# Patient Record
Sex: MALE | Race: WHITE
[De-identification: names, ages, dates, MRNs, and addresses within clinical notes are randomized per-mention and may not be internally consistent; named-entity substitution may affect disease eponyms.]

---

## 2019-01-31 ENCOUNTER — HOSPITAL ENCOUNTER (EMERGENCY)
Dept: HOSPITAL 88 - ER | Age: 44
Discharge: HOME | End: 2019-01-31
Payer: COMMERCIAL

## 2019-01-31 VITALS — DIASTOLIC BLOOD PRESSURE: 91 MMHG | SYSTOLIC BLOOD PRESSURE: 136 MMHG

## 2019-01-31 VITALS — BODY MASS INDEX: 23.87 KG/M2 | HEIGHT: 74 IN | WEIGHT: 186 LBS

## 2019-01-31 DIAGNOSIS — M51.16: ICD-10-CM

## 2019-01-31 DIAGNOSIS — M54.5: Primary | ICD-10-CM

## 2019-01-31 DIAGNOSIS — M54.16: ICD-10-CM

## 2019-01-31 DIAGNOSIS — M54.31: ICD-10-CM

## 2019-01-31 DIAGNOSIS — M51.34: ICD-10-CM
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
## 2019-01-31 DIAGNOSIS — M25.511: ICD-10-CM
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
## 2019-01-31 DIAGNOSIS — M51.14: ICD-10-CM

## 2019-01-31 DIAGNOSIS — G89.29: ICD-10-CM

## 2019-01-31 PROCEDURE — 99284 EMERGENCY DEPT VISIT MOD MDM: CPT

## 2019-01-31 PROCEDURE — 72131 CT LUMBAR SPINE W/O DYE: CPT

## 2019-01-31 PROCEDURE — 73030 X-RAY EXAM OF SHOULDER: CPT

## 2019-01-31 PROCEDURE — 72128 CT CHEST SPINE W/O DYE: CPT

## 2019-01-31 NOTE — DIAGNOSTIC IMAGING REPORT
RIGHT SHOULDER - 2 Images    



HISTORY:  Pain    

COMPARISON: None available.

     

FINDINGS:

Suboptimal external rotation.

Partially visualized right upper extremity approach catheter.



Bones:

Flattening of the posterior aspect of the humeral head with subtle cortical

irregularity and adjacent faint calcific densities.

No aggressive osseous lesion.



Joints:

Osseous alignment is within normal limits and the joint spaces are

well-maintained.



Soft tissues:

The soft tissues appear unremarkable.





IMPRESSION: 

Mild age-indeterminate impaction deformity of the posterior aspect of the

humeral head, correlate for history of blunt trauma or dislocation.



Signed by: Dr. Lukas Warren D.O., M.M.M. on 1/31/2019 12:35 PM

## 2019-01-31 NOTE — DIAGNOSTIC IMAGING REPORT
History: Back pain, continue to get worse

Comparison studies: CT chest 9/5/2018



Technique:: 

Axial were obtained through the  thoracic and lumbar regions.

Coronal and sagittal images reconstructed from the axial data.

Intravenous contrast: None



Dose modulation, iterative reconstruction, and/or weight based adjustment of

the mA/kV was utilized to reduce the radiation dose to as low as reasonably

achievable.



Findings:



Fractures: No acute fracture. Mild chronic wedge deformity from T10 through T12

vertebral bodies, without retropulsion, cortical step-off or soft tissue

changes, stable.

Soft tissues:No paraspinal gross abnormalities . Mild atherosclerotic

calcifications of the abdominal aorta and branches.



Atlantoaxial articulation: Intact.

Alignment: Normal lumbar lordosis. Mildly increased thoracic kyphosis centered

at T10-11. No scoliosis.

Cervicomedullary junction: No abnormalities. The foramen magnum is patent.



Soft tissues: No abnormalities..

Paraspinal muscles: Unremarkable 

Spinal cord: Can not be evaluated.



Vertebrae: 

No infection or neoplasm.



Degenerative changes:



Thoracic spine :

Disc degeneration with decreased intervertebral space from T5 through T12 with

associated endplate Schmorl nodes from T6 through T12 superior endplate.

At T10-11, right central disc osteophyte complex results in mild canal stenosis

without significant foraminal narrowing.

Mild bilateral foraminal narrowing from T6 through T11.

The remaining canal is grossly patent.

Anteriorly projecting osteophytes from T7 through T12 right central anterior

margin 



Lumbar spine :

At L2-L3, diffuse disc bulge and mild facet hypertrophy results in no

significant canal stenosis and mild bilateral foraminal narrowing.

At L3-L4, mild diffuse disc bulge and mild facet hypertrophy results in mild

canal stenosis and mild bilateral foraminal narrowing.

At L4-L5, disc degeneration with decreased intervertebral space, diffuse disc

bulge and mild facet hypertrophy results in mild canal stenosis, narrowing of

the bilateral subarticular recesses, mild right and moderate left foraminal

narrowing.

At L5-S1, disc degeneration with decreased intervertebral space and endplate

sclerotic changes. Diffuse disc bulge and mild facet hypertrophy results in

moderate bilateral foraminal narrowing.



IMPRESSION:



1.  No acute thoracic or lumbar abnormalities.

2.  Mild disc degeneration at the thoracic spine from T5 through T12 with

associated Schmorl nodes from T6 through T12, stable.

3.  Mild canal stenosis at T10-11 secondary to right central disc osteophyte

complex mild bilateral foraminal narrowing from T6 through T11. The remaining

canal and foramina are grossly patent, stable.

4.  Mild disc degeneration from L2 through L5 more significant at L5-S1 and

sclerotic changes. 

5.  Moderate degenerative foraminal narrowing at L4-L5 on the left and L5-S1

bilaterally. Mild degenerative canal stenosis at L3-L4 and L4-L5.







Signed by: DR Norris Reynaga M.D. on 1/31/2019 1:21 PM

## 2019-01-31 NOTE — XMS REPORT
Clinical Summary

                             Created on: 2019



Jake Herrmann, Gallo ASH

External Reference #: [id number]

: 1975

Sex: Male



Demographics







                          Address                   [address]

[place]  [address]

 

                          Home Phone                [phone]

 

                          Preferred Language        English

 

                          Marital Status            Unknown

 

                          Christianity Affiliation     None

 

                          Race                      White

 

                          Ethnic Group              Non-





Author







                          Author                    ANUPAMA [facility]

 

                          Address                   Unknown

 

                          Phone                     Unavailable







Support







                Name            Relationship    Address         Phone

 

                    Mimi Dickens        EC[employer]                [address] [place]  [address]                     [phone]

 

                    Ynes Joseph        EC[employer]                [address]

[place]  [address]                [phone]







Care Team Providers







                    Care Team Member Name    Role                Phone

 

                    Ramy Minor MD    PCP                 [phone]







Allergies







                                        Comments



                 Active Allergy     Reactions       Severity        Noted Date 

 

                                        



Constipation.



                     Codeine             Other (See          2016 



                                         Comments)   

 

                                        



States can take Keflex without problem. 



                 Penicillins     Swelling,       Low             2016 



                                         Rash   







Medications







                          End Date                  Status



              Medication     Sig          Dispensed     Refills      Start  



                                         Date  

 

                                                    Active



                     esomeprazole (NEXIUM) 20     Take 20 mg by       0   



                           MG capsule                mouth daily.     

 

                                                    Active



                     diphenhydrAMINE     Take 100 mg         0   



                           (BENADRYL) 50 MG tablet     by mouth     



                                         every night     



                                         as needed for     



                                         Itching.     

 

                                                    Active



                     HYDROcodone-acetaminophen     Take 1 tablet       0   



                           (NORCO 5-325) 5-325 mg     by mouth     



                           per tablet                every 6 (six)     



                                         hours as     



                                         needed for     



                                         Pain.     







Active Problems







 



                           Problem                   Noted Date

 

 



                           Extensor tendon laceration, finger, open wound     2016







Social History







                                        Date



                 Tobacco Use     Types           Packs/Day       Years Used 

 

                                         



                           Current Every Day Smoker       30 

 

    



                                         Smokeless Tobacco: Never   



                                         Used   









                                        Tobacco Cessation: Ready to Quit: Yes; Counseling Given: Yes

Comments: down to 1/3 ppd from 2 ppd.  In process of quitting. 









   



                 Alcohol Use     Drinks/Week     oz/Week         Comments

 

   



                                         No   









 



                           Sex Assigned at Birth     Date Recorded

 

 



                                         Not on file 









                                        Industry



                           Job Start Date            Occupation 

 

                                        Not on file



                           Not on file               Not on file 









                                        Travel End



                           Travel History            Travel Start 

 





                                         No recent travel history available.







Last Filed Vital Signs

Not on file



Plan of Treatment





Not on file



Results

Not on fileafter 2018



Insurance







     



            Payer      Benefit     Subscriber ID     Type       Phone      Address



                                         Plan /    



                                         Group    

 

     



                     [facility]              xxxxxxxxx   



                           MEDICARE MGD CARE         MEDICARE    



                                         HMO    









     



            Guarantor Name     Account     Relation to     Date of     Phone      Billing Address



                     Type                Patient             Birth  

 

     



            Gallo Joseph MIHIR Lew.     Personal/F     Self       1975     [phone] [address]



                     amily               (Home)              [place] [address]







Advance Directives





For more information, please contact:



[address] [address] [phone]









                          Date Inactivated          Comments



                           Code Status               Date Activated  

 

                          2016  5:11 PM         



                           Full Code                 2016 10:13 AM  









  



                           This code status was determined by:     Patient

## 2019-10-06 ENCOUNTER — HOSPITAL ENCOUNTER (OUTPATIENT)
Dept: HOSPITAL 88 - ER | Age: 44
Setting detail: OBSERVATION
LOS: 1 days | Discharge: HOME | End: 2019-10-07
Attending: INTERNAL MEDICINE | Admitting: INTERNAL MEDICINE
Payer: MEDICARE

## 2019-10-06 VITALS — BODY MASS INDEX: 23.87 KG/M2 | WEIGHT: 186 LBS | HEIGHT: 74 IN

## 2019-10-06 VITALS — DIASTOLIC BLOOD PRESSURE: 59 MMHG | SYSTOLIC BLOOD PRESSURE: 101 MMHG

## 2019-10-06 DIAGNOSIS — T87.44: Primary | ICD-10-CM

## 2019-10-06 DIAGNOSIS — I73.9: ICD-10-CM

## 2019-10-06 DIAGNOSIS — G89.4: ICD-10-CM

## 2019-10-06 DIAGNOSIS — K21.9: ICD-10-CM

## 2019-10-06 DIAGNOSIS — Z87.820: ICD-10-CM

## 2019-10-06 DIAGNOSIS — E78.5: ICD-10-CM

## 2019-10-06 DIAGNOSIS — L03.116: ICD-10-CM

## 2019-10-06 DIAGNOSIS — F51.04: ICD-10-CM

## 2019-10-06 DIAGNOSIS — F17.210: ICD-10-CM

## 2019-10-06 DIAGNOSIS — T87.89: ICD-10-CM

## 2019-10-06 DIAGNOSIS — I82.502: ICD-10-CM

## 2019-10-06 LAB
ALBUMIN SERPL-MCNC: 3.4 G/DL (ref 3.5–5)
ALBUMIN/GLOB SERPL: 0.9 {RATIO} (ref 0.8–2)
ALP SERPL-CCNC: 77 IU/L (ref 40–150)
ALT SERPL-CCNC: 14 IU/L (ref 0–55)
ANION GAP SERPL CALC-SCNC: 14.5 MMOL/L (ref 8–16)
BASOPHILS # BLD AUTO: 0.1 10*3/UL (ref 0–0.1)
BASOPHILS NFR BLD AUTO: 0.6 % (ref 0–1)
BUN SERPL-MCNC: 12 MG/DL (ref 7–26)
BUN/CREAT SERPL: 15 (ref 6–25)
CALCIUM SERPL-MCNC: 9.8 MG/DL (ref 8.4–10.2)
CHLORIDE SERPL-SCNC: 102 MMOL/L (ref 98–107)
CO2 SERPL-SCNC: 24 MMOL/L (ref 22–29)
DEPRECATED INR PLAS: 1.12
DEPRECATED NEUTROPHILS # BLD AUTO: 5.8 10*3/UL (ref 2.1–6.9)
EGFRCR SERPLBLD CKD-EPI 2021: > 60 ML/MIN (ref 60–?)
EOSINOPHIL # BLD AUTO: 0.1 10*3/UL (ref 0–0.4)
EOSINOPHIL NFR BLD AUTO: 1.2 % (ref 0–6)
ERYTHROCYTE [DISTWIDTH] IN CORD BLOOD: 15.1 % (ref 11.7–14.4)
GLOBULIN PLAS-MCNC: 3.9 G/DL (ref 2.3–3.5)
GLUCOSE SERPLBLD-MCNC: 102 MG/DL (ref 74–118)
HCT VFR BLD AUTO: 44.5 % (ref 38.2–49.6)
HGB BLD-MCNC: 15.2 G/DL (ref 14–18)
LYMPHOCYTES # BLD: 2 10*3/UL (ref 1–3.2)
LYMPHOCYTES NFR BLD AUTO: 22.5 % (ref 18–39.1)
MCH RBC QN AUTO: 31.4 PG (ref 28–32)
MCHC RBC AUTO-ENTMCNC: 34.2 G/DL (ref 31–35)
MCV RBC AUTO: 91.9 FL (ref 81–99)
MONOCYTES # BLD AUTO: 1 10*3/UL (ref 0.2–0.8)
MONOCYTES NFR BLD AUTO: 10.8 % (ref 4.4–11.3)
NEUTS SEG NFR BLD AUTO: 64.6 % (ref 38.7–80)
PLATELET # BLD AUTO: 313 X10E3/UL (ref 140–360)
POTASSIUM SERPL-SCNC: 3.5 MMOL/L (ref 3.5–5.1)
PROTHROMBIN TIME: 14.9 SECONDS (ref 11.9–14.5)
RBC # BLD AUTO: 4.84 X10E6/UL (ref 4.3–5.7)
SODIUM SERPL-SCNC: 137 MMOL/L (ref 136–145)

## 2019-10-06 PROCEDURE — 73562 X-RAY EXAM OF KNEE 3: CPT

## 2019-10-06 PROCEDURE — 87040 BLOOD CULTURE FOR BACTERIA: CPT

## 2019-10-06 PROCEDURE — 83605 ASSAY OF LACTIC ACID: CPT

## 2019-10-06 PROCEDURE — 80053 COMPREHEN METABOLIC PANEL: CPT

## 2019-10-06 PROCEDURE — 73590 X-RAY EXAM OF LOWER LEG: CPT

## 2019-10-06 PROCEDURE — 99284 EMERGENCY DEPT VISIT MOD MDM: CPT

## 2019-10-06 PROCEDURE — 87205 SMEAR GRAM STAIN: CPT

## 2019-10-06 PROCEDURE — 87071 CULTURE AEROBIC QUANT OTHER: CPT

## 2019-10-06 PROCEDURE — 36415 COLL VENOUS BLD VENIPUNCTURE: CPT

## 2019-10-06 PROCEDURE — 85610 PROTHROMBIN TIME: CPT

## 2019-10-06 PROCEDURE — 85025 COMPLETE CBC W/AUTO DIFF WBC: CPT

## 2019-10-06 RX ADMIN — Medication PRN MG: at 21:25

## 2019-10-06 RX ADMIN — SODIUM CHLORIDE SCH MLS/HR: 9 INJECTION, SOLUTION INTRAVENOUS at 20:04

## 2019-10-06 RX ADMIN — CEFEPIME SCH MLS/HR: 1 INJECTION, SOLUTION INTRAVENOUS at 17:10

## 2019-10-06 NOTE — NUR
patient arrived to unit via stretcher at this time. alert and oriented and in no distress. 
call bell within reach, bed in lowest locked position.

## 2019-10-06 NOTE — NUR
SPOKE TO DR. SCHMITZ AT THIS TIME REGARDING HOME MEDS. NEW ORDERS RECEIVED. SEE EMR FOR LIST 
OF HOME MEDS CONTINUED. MD ALSO ORDERED PT INR. WILL CALL FOR RESULTS

## 2019-10-06 NOTE — DIAGNOSTIC IMAGING REPORT
LEFT KNEE/LOWER LEG -3 images of the knee and 2 images of the residual lower

leg



HISTORY:  Stomach pain, DKA, stump cellulitis, possible infection    

COMPARISON: None available.

     

FINDINGS:

Bones:

Status post below-the-knee amputation at the level of the proximal tibial and

fibular diaphyses.



Joints:

Osseous alignment is within normal limits and the joint spaces are

well-maintained.



Soft tissues:

Soft tissue irregularity at the amputation site with regional soft tissue

swelling.

Metallic stent projects posterior to the distal femoral metaphysis.





IMPRESSION: 

1.  Status post below the knee amputation, findings compatible with the

provided history of cellulitis. 

2.  No specific radiographic evidence of osteomyelitis.



Signed by: Dr. Lukas Warren D.O., M.M.M. on 10/6/2019 5:12 PM

## 2019-10-07 VITALS — SYSTOLIC BLOOD PRESSURE: 100 MMHG | DIASTOLIC BLOOD PRESSURE: 56 MMHG

## 2019-10-07 VITALS — DIASTOLIC BLOOD PRESSURE: 78 MMHG | SYSTOLIC BLOOD PRESSURE: 142 MMHG

## 2019-10-07 VITALS — SYSTOLIC BLOOD PRESSURE: 115 MMHG | DIASTOLIC BLOOD PRESSURE: 70 MMHG

## 2019-10-07 VITALS — DIASTOLIC BLOOD PRESSURE: 81 MMHG | SYSTOLIC BLOOD PRESSURE: 143 MMHG

## 2019-10-07 LAB
ALBUMIN SERPL-MCNC: 2.8 G/DL (ref 3.5–5)
ALBUMIN/GLOB SERPL: 0.8 {RATIO} (ref 0.8–2)
ALP SERPL-CCNC: 65 IU/L (ref 40–150)
ALT SERPL-CCNC: 14 IU/L (ref 0–55)
ANION GAP SERPL CALC-SCNC: 11.4 MMOL/L (ref 8–16)
BASOPHILS # BLD AUTO: 0.1 10*3/UL (ref 0–0.1)
BASOPHILS NFR BLD AUTO: 0.9 % (ref 0–1)
BUN SERPL-MCNC: 14 MG/DL (ref 7–26)
BUN/CREAT SERPL: 16 (ref 6–25)
CALCIUM SERPL-MCNC: 9.6 MG/DL (ref 8.4–10.2)
CHLORIDE SERPL-SCNC: 104 MMOL/L (ref 98–107)
CO2 SERPL-SCNC: 28 MMOL/L (ref 22–29)
DEPRECATED NEUTROPHILS # BLD AUTO: 4.1 10*3/UL (ref 2.1–6.9)
EGFRCR SERPLBLD CKD-EPI 2021: > 60 ML/MIN (ref 60–?)
EOSINOPHIL # BLD AUTO: 0.2 10*3/UL (ref 0–0.4)
EOSINOPHIL NFR BLD AUTO: 2.9 % (ref 0–6)
ERYTHROCYTE [DISTWIDTH] IN CORD BLOOD: 15.1 % (ref 11.7–14.4)
GLOBULIN PLAS-MCNC: 3.5 G/DL (ref 2.3–3.5)
GLUCOSE SERPLBLD-MCNC: 89 MG/DL (ref 74–118)
HCT VFR BLD AUTO: 41.5 % (ref 38.2–49.6)
HGB BLD-MCNC: 13.8 G/DL (ref 14–18)
LYMPHOCYTES # BLD: 2.6 10*3/UL (ref 1–3.2)
LYMPHOCYTES NFR BLD AUTO: 32.8 % (ref 18–39.1)
MCH RBC QN AUTO: 31.5 PG (ref 28–32)
MCHC RBC AUTO-ENTMCNC: 33.3 G/DL (ref 31–35)
MCV RBC AUTO: 94.7 FL (ref 81–99)
MONOCYTES # BLD AUTO: 0.9 10*3/UL (ref 0.2–0.8)
MONOCYTES NFR BLD AUTO: 11.1 % (ref 4.4–11.3)
NEUTS SEG NFR BLD AUTO: 51.9 % (ref 38.7–80)
PLATELET # BLD AUTO: 271 X10E3/UL (ref 140–360)
POTASSIUM SERPL-SCNC: 4.4 MMOL/L (ref 3.5–5.1)
RBC # BLD AUTO: 4.38 X10E6/UL (ref 4.3–5.7)
SODIUM SERPL-SCNC: 139 MMOL/L (ref 136–145)

## 2019-10-07 RX ADMIN — SODIUM CHLORIDE PRN MG: 900 INJECTION INTRAVENOUS at 05:35

## 2019-10-07 RX ADMIN — Medication PRN MG: at 09:20

## 2019-10-07 RX ADMIN — Medication PRN MG: at 01:33

## 2019-10-07 RX ADMIN — Medication PRN MG: at 05:35

## 2019-10-07 RX ADMIN — SODIUM CHLORIDE SCH MLS/HR: 9 INJECTION, SOLUTION INTRAVENOUS at 06:50

## 2019-10-07 RX ADMIN — CEFEPIME SCH MLS/HR: 1 INJECTION, SOLUTION INTRAVENOUS at 05:01

## 2019-10-07 RX ADMIN — SODIUM CHLORIDE PRN MG: 900 INJECTION INTRAVENOUS at 09:20
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
## 2019-10-07 NOTE — NUR
bedside rounds complete no distress noted, updated on poc voiced understanding, denies pain 
at this time, redness noted to l bka warm to touch, ivf infusing to l ac 20g no ss of 
infiltration noted, no other co voiced call light in reach will continue to monitor

## 2019-10-08 ENCOUNTER — HOSPITAL ENCOUNTER (INPATIENT)
Dept: HOSPITAL 88 - ER | Age: 44
LOS: 14 days | Discharge: HOME | DRG: 493 | End: 2019-10-22
Attending: INTERNAL MEDICINE | Admitting: INTERNAL MEDICINE
Payer: MEDICARE

## 2019-10-08 VITALS — WEIGHT: 200.56 LBS | BODY MASS INDEX: 24.94 KG/M2 | HEIGHT: 75 IN

## 2019-10-08 DIAGNOSIS — Z79.01: ICD-10-CM

## 2019-10-08 DIAGNOSIS — F43.10: ICD-10-CM

## 2019-10-08 DIAGNOSIS — Z86.718: ICD-10-CM

## 2019-10-08 DIAGNOSIS — G89.29: ICD-10-CM

## 2019-10-08 DIAGNOSIS — Z86.711: ICD-10-CM

## 2019-10-08 DIAGNOSIS — E78.5: ICD-10-CM

## 2019-10-08 DIAGNOSIS — T87.44: Primary | ICD-10-CM

## 2019-10-08 DIAGNOSIS — F17.200: ICD-10-CM

## 2019-10-08 DIAGNOSIS — G40.909: ICD-10-CM

## 2019-10-08 DIAGNOSIS — L02.416: ICD-10-CM

## 2019-10-08 LAB
ANION GAP SERPL CALC-SCNC: 12 MMOL/L (ref 8–16)
BASOPHILS # BLD AUTO: 0 10*3/UL (ref 0–0.1)
BASOPHILS NFR BLD AUTO: 0.3 % (ref 0–1)
BUN SERPL-MCNC: 13 MG/DL (ref 7–26)
BUN/CREAT SERPL: 15 (ref 6–25)
CALCIUM SERPL-MCNC: 9.2 MG/DL (ref 8.4–10.2)
CHLORIDE SERPL-SCNC: 103 MMOL/L (ref 98–107)
CO2 SERPL-SCNC: 25 MMOL/L (ref 22–29)
DEPRECATED NEUTROPHILS # BLD AUTO: 6.7 10*3/UL (ref 2.1–6.9)
EGFRCR SERPLBLD CKD-EPI 2021: > 60 ML/MIN (ref 60–?)
EOSINOPHIL # BLD AUTO: 0.2 10*3/UL (ref 0–0.4)
EOSINOPHIL NFR BLD AUTO: 2 % (ref 0–6)
ERYTHROCYTE [DISTWIDTH] IN CORD BLOOD: 15 % (ref 11.7–14.4)
GLUCOSE SERPLBLD-MCNC: 83 MG/DL (ref 74–118)
HCT VFR BLD AUTO: 39.4 % (ref 38.2–49.6)
HGB BLD-MCNC: 13.8 G/DL (ref 14–18)
LYMPHOCYTES # BLD: 2.3 10*3/UL (ref 1–3.2)
LYMPHOCYTES NFR BLD AUTO: 22.8 % (ref 18–39.1)
MCH RBC QN AUTO: 32.4 PG (ref 28–32)
MCHC RBC AUTO-ENTMCNC: 35 G/DL (ref 31–35)
MCV RBC AUTO: 92.5 FL (ref 81–99)
MONOCYTES # BLD AUTO: 0.7 10*3/UL (ref 0.2–0.8)
MONOCYTES NFR BLD AUTO: 7 % (ref 4.4–11.3)
NEUTS SEG NFR BLD AUTO: 67.5 % (ref 38.7–80)
PLATELET # BLD AUTO: 324 X10E3/UL (ref 140–360)
POTASSIUM SERPL-SCNC: 4 MMOL/L (ref 3.5–5.1)
RBC # BLD AUTO: 4.26 X10E6/UL (ref 4.3–5.7)
SODIUM SERPL-SCNC: 136 MMOL/L (ref 136–145)

## 2019-10-08 PROCEDURE — 87075 CULTR BACTERIA EXCEPT BLOOD: CPT

## 2019-10-08 PROCEDURE — 87205 SMEAR GRAM STAIN: CPT

## 2019-10-08 PROCEDURE — 73701 CT LOWER EXTREMITY W/DYE: CPT

## 2019-10-08 PROCEDURE — 88304 TISSUE EXAM BY PATHOLOGIST: CPT

## 2019-10-08 PROCEDURE — 86140 C-REACTIVE PROTEIN: CPT

## 2019-10-08 PROCEDURE — 36415 COLL VENOUS BLD VENIPUNCTURE: CPT

## 2019-10-08 PROCEDURE — 85610 PROTHROMBIN TIME: CPT

## 2019-10-08 PROCEDURE — 85025 COMPLETE CBC W/AUTO DIFF WBC: CPT

## 2019-10-08 PROCEDURE — 84443 ASSAY THYROID STIM HORMONE: CPT

## 2019-10-08 PROCEDURE — 87186 SC STD MICRODIL/AGAR DIL: CPT

## 2019-10-08 PROCEDURE — 80053 COMPREHEN METABOLIC PANEL: CPT

## 2019-10-08 PROCEDURE — 88311 DECALCIFY TISSUE: CPT

## 2019-10-08 PROCEDURE — 88305 TISSUE EXAM BY PATHOLOGIST: CPT

## 2019-10-08 PROCEDURE — 99284 EMERGENCY DEPT VISIT MOD MDM: CPT

## 2019-10-08 PROCEDURE — 80202 ASSAY OF VANCOMYCIN: CPT

## 2019-10-08 PROCEDURE — 87071 CULTURE AEROBIC QUANT OTHER: CPT

## 2019-10-08 PROCEDURE — 80048 BASIC METABOLIC PNL TOTAL CA: CPT

## 2019-10-09 VITALS — SYSTOLIC BLOOD PRESSURE: 124 MMHG | DIASTOLIC BLOOD PRESSURE: 83 MMHG

## 2019-10-09 VITALS — DIASTOLIC BLOOD PRESSURE: 59 MMHG | SYSTOLIC BLOOD PRESSURE: 100 MMHG

## 2019-10-09 VITALS — DIASTOLIC BLOOD PRESSURE: 82 MMHG | SYSTOLIC BLOOD PRESSURE: 141 MMHG

## 2019-10-09 VITALS — SYSTOLIC BLOOD PRESSURE: 141 MMHG | DIASTOLIC BLOOD PRESSURE: 82 MMHG

## 2019-10-09 VITALS — DIASTOLIC BLOOD PRESSURE: 75 MMHG | SYSTOLIC BLOOD PRESSURE: 104 MMHG

## 2019-10-09 VITALS — DIASTOLIC BLOOD PRESSURE: 81 MMHG | SYSTOLIC BLOOD PRESSURE: 119 MMHG

## 2019-10-09 VITALS — SYSTOLIC BLOOD PRESSURE: 127 MMHG | DIASTOLIC BLOOD PRESSURE: 72 MMHG

## 2019-10-09 VITALS — SYSTOLIC BLOOD PRESSURE: 110 MMHG | DIASTOLIC BLOOD PRESSURE: 73 MMHG

## 2019-10-09 RX ADMIN — CEFEPIME HYDROCHLORIDE SCH MLS/HR: 1 INJECTION, POWDER, FOR SOLUTION INTRAMUSCULAR; INTRAVENOUS at 01:48

## 2019-10-09 RX ADMIN — SODIUM CHLORIDE SCH MLS/HR: 9 INJECTION, SOLUTION INTRAVENOUS at 14:24

## 2019-10-09 RX ADMIN — PREGABALIN SCH MG: 75 CAPSULE ORAL at 17:33

## 2019-10-09 RX ADMIN — ENOXAPARIN SODIUM SCH MG: 60 INJECTION SUBCUTANEOUS at 20:23

## 2019-10-09 RX ADMIN — QUETIAPINE SCH MG: 25 TABLET, FILM COATED ORAL at 20:23

## 2019-10-09 RX ADMIN — SODIUM CHLORIDE SCH MLS/HR: 9 INJECTION, SOLUTION INTRAVENOUS at 23:10

## 2019-10-09 RX ADMIN — SODIUM CHLORIDE PRN MG: 900 INJECTION INTRAVENOUS at 02:27

## 2019-10-09 RX ADMIN — CEFEPIME HYDROCHLORIDE SCH MLS/HR: 1 INJECTION, POWDER, FOR SOLUTION INTRAMUSCULAR; INTRAVENOUS at 23:10

## 2019-10-09 RX ADMIN — SODIUM CHLORIDE SCH MLS/HR: 9 INJECTION, SOLUTION INTRAVENOUS at 16:27

## 2019-10-09 RX ADMIN — SODIUM CHLORIDE SCH MLS/HR: 9 INJECTION, SOLUTION INTRAVENOUS at 01:48

## 2019-10-09 RX ADMIN — SODIUM CHLORIDE PRN MG: 900 INJECTION INTRAVENOUS at 07:46

## 2019-10-09 RX ADMIN — DULOXETINE HYDROCHLORIDE SCH MG: 30 CAPSULE, DELAYED RELEASE ORAL at 17:33

## 2019-10-09 RX ADMIN — Medication PRN MG: at 07:46

## 2019-10-09 RX ADMIN — ATORVASTATIN CALCIUM SCH MG: 10 TABLET, FILM COATED ORAL at 20:23

## 2019-10-09 RX ADMIN — ZOLPIDEM TARTRATE PRN MG: 10 TABLET, FILM COATED ORAL at 20:23

## 2019-10-09 RX ADMIN — CEFEPIME HYDROCHLORIDE SCH MLS/HR: 1 INJECTION, POWDER, FOR SOLUTION INTRAMUSCULAR; INTRAVENOUS at 14:24

## 2019-10-09 RX ADMIN — SODIUM CHLORIDE PRN MG: 900 INJECTION INTRAVENOUS at 11:40

## 2019-10-09 RX ADMIN — SODIUM CHLORIDE PRN MG: 900 INJECTION INTRAVENOUS at 20:23

## 2019-10-09 RX ADMIN — Medication PRN MG: at 02:27

## 2019-10-09 RX ADMIN — Medication PRN MG: at 20:23

## 2019-10-09 RX ADMIN — Medication PRN MG: at 11:40

## 2019-10-09 NOTE — CONSULTATION
DATE OF CONSULTATION:  10/09/2019  

 

CHIEF COMPLAINT:  Left BKA stump infection.

 

HISTORY OF PRESENT ILLNESS:  The patient is a 44-year-old male with history of left

below-knee amputations using prosthesis.  He complained of swelling, redness, and

tenderness in the left BKA stump for 5 days with subjective fevers.  The patient state

that he has been having difficulty with the prosthesis fitting well. 

 

PAST MEDICAL HISTORY:  Positive for peripheral vascular disease, Buerger syndrome,

traumatic brain injury in 2004, hypertension, hyperlipidemia, history of DVT, peripheral

neuropathy, GERD, and posttraumatic stress syndrome. 

 

PAST SURGICAL HISTORY:  Positive for left below-knee amputation, craniotomy, right

facial maxillary reconstruction, right ACL knee surgery, gastric surgery for ulcer, and

lumbar laminectomy. 

 

ALLERGIES:  HE IS ALLERGIC TO CODEINE AND PENICILLIN.

 

SOCIAL HISTORY:  History of alcohol use.  Denies alcohol abuse.

 

REVIEW OF SYSTEMS:

No chest pain or shortness of breath.

 

PHYSICAL EXAMINATION:

VITAL SIGNS:  The patient's vital signs are stable, afebrile. 

GENERAL:  He is awake, alert, in moderate discomfort. 

HEENT:  Sclerae nonicteric. 

NECK:  Supple. 

LUNGS:  Clear. 

HEART:  Regular rate and rhythm. 

ABDOMEN:  Soft and nontender. 

EXTREMITIES:  Reveals the left BKA stump with erythema, edema, and tenderness on the

medial aspect of the stump with some fluctuance noted. 

LABORATORY DATA:  White cell count is 8 and hemoglobin of 13.  Creatinine of 0.8.  INR

of 1.1.  X-ray of the stump revealed soft tissue inflammation.  No osteomyelitis. 

 

ASSESSMENT:  Left below-knee amputation stump soft tissue infection, possible abscess

formation. 

 

PLAN:  Incision and drainage of left BKA stump under anesthesia.  Attendant risks

discussed. 

 

 

 

 

______________________________

MD KASHIF Sin/JB

D:  10/09/2019 12:23:24

T:  10/09/2019 18:03:30

Job #:  [id number]

## 2019-10-09 NOTE — NUR
RECEIVED AM REPORT FROM NURSE AND MORNING ROUNDS DONE. PT IS SLEEPING, NO S/S OF DISTRESS. 
CALL LIGHT WITHIN REACH AND SIDE RAILS UP .

## 2019-10-09 NOTE — NUR
Patient arrived via stretcher. Received report from CAR Hernandez nurse. Call light within 
reach. Patient in bed.
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
## 2019-10-09 NOTE — DIAGNOSTIC IMAGING REPORT
CT scan of the LEFT LOWER EXTREMITY WITH injected contrast.



TECHNIQUE:

Standard departmental protocols were used. 

Post-contrast images were obtained after the intravenous injection of 100 cc of

Isovue 370.

Sagittal and coronal reformatted images were obtained. 

DLP: 167.76.



HISTORY:  Pain and swelling in the left lower extremity stump.



COMPARISON:  None.



FINDINGS:



Bones: Postoperative changes of below the knee amputation, with diffuse disease

osteopenia. No definite bone erosion involving the stump.



Soft tissues: There is generalized subcutaneous edema below the knee

particularly about the stump, with fluid attenuation just distal to the stump

estimated at 5.8 x 1.9 cm in transverse and craniocaudal dimension on coronal

image 24, concerning for phlegmon and early abscess formation; correlate for

fluctuation.



There is atherosclerotic disease throughout the visualized distal SFA and

popliteal artery, with an occluded stent within the distal SFA just proximal to

the adductor canal.



IMPRESSION: 

Fluid attenuation just distal to the stump, concerning for abscess formation;

please correlate for fluctuation, and informed, incision and drainage.



Signed by: Dr. GLADIS Cedillo M.D. on 10/9/2019 12:17 AM

## 2019-10-10 VITALS — SYSTOLIC BLOOD PRESSURE: 169 MMHG | DIASTOLIC BLOOD PRESSURE: 100 MMHG

## 2019-10-10 VITALS — SYSTOLIC BLOOD PRESSURE: 118 MMHG | DIASTOLIC BLOOD PRESSURE: 67 MMHG

## 2019-10-10 VITALS — DIASTOLIC BLOOD PRESSURE: 90 MMHG | SYSTOLIC BLOOD PRESSURE: 142 MMHG

## 2019-10-10 VITALS — DIASTOLIC BLOOD PRESSURE: 83 MMHG | SYSTOLIC BLOOD PRESSURE: 133 MMHG

## 2019-10-10 VITALS — DIASTOLIC BLOOD PRESSURE: 99 MMHG | SYSTOLIC BLOOD PRESSURE: 161 MMHG

## 2019-10-10 VITALS — SYSTOLIC BLOOD PRESSURE: 112 MMHG | DIASTOLIC BLOOD PRESSURE: 67 MMHG

## 2019-10-10 LAB
ALBUMIN SERPL-MCNC: 2.8 G/DL (ref 3.5–5)
ALBUMIN/GLOB SERPL: 0.8 {RATIO} (ref 0.8–2)
ALP SERPL-CCNC: 59 IU/L (ref 40–150)
ALT SERPL-CCNC: 11 IU/L (ref 0–55)
ANION GAP SERPL CALC-SCNC: 10.1 MMOL/L (ref 8–16)
BASOPHILS # BLD AUTO: 0.1 10*3/UL (ref 0–0.1)
BASOPHILS NFR BLD AUTO: 0.8 % (ref 0–1)
BUN SERPL-MCNC: 8 MG/DL (ref 7–26)
BUN/CREAT SERPL: 9 (ref 6–25)
CALCIUM SERPL-MCNC: 8.4 MG/DL (ref 8.4–10.2)
CHLORIDE SERPL-SCNC: 103 MMOL/L (ref 98–107)
CO2 SERPL-SCNC: 29 MMOL/L (ref 22–29)
DEPRECATED NEUTROPHILS # BLD AUTO: 3.2 10*3/UL (ref 2.1–6.9)
EGFRCR SERPLBLD CKD-EPI 2021: > 60 ML/MIN (ref 60–?)
EOSINOPHIL # BLD AUTO: 0.3 10*3/UL (ref 0–0.4)
EOSINOPHIL NFR BLD AUTO: 4.2 % (ref 0–6)
ERYTHROCYTE [DISTWIDTH] IN CORD BLOOD: 15 % (ref 11.7–14.4)
GLOBULIN PLAS-MCNC: 3.3 G/DL (ref 2.3–3.5)
GLUCOSE SERPLBLD-MCNC: 89 MG/DL (ref 74–118)
HCT VFR BLD AUTO: 41.1 % (ref 38.2–49.6)
HGB BLD-MCNC: 13.4 G/DL (ref 14–18)
LYMPHOCYTES # BLD: 1.8 10*3/UL (ref 1–3.2)
LYMPHOCYTES NFR BLD AUTO: 28.9 % (ref 18–39.1)
MCH RBC QN AUTO: 31.4 PG (ref 28–32)
MCHC RBC AUTO-ENTMCNC: 32.6 G/DL (ref 31–35)
MCV RBC AUTO: 96.3 FL (ref 81–99)
MONOCYTES # BLD AUTO: 0.8 10*3/UL (ref 0.2–0.8)
MONOCYTES NFR BLD AUTO: 13.7 % (ref 4.4–11.3)
NEUTS SEG NFR BLD AUTO: 51.9 % (ref 38.7–80)
PLATELET # BLD AUTO: 298 X10E3/UL (ref 140–360)
POTASSIUM SERPL-SCNC: 4.1 MMOL/L (ref 3.5–5.1)
RBC # BLD AUTO: 4.27 X10E6/UL (ref 4.3–5.7)
SODIUM SERPL-SCNC: 138 MMOL/L (ref 136–145)

## 2019-10-10 PROCEDURE — 0J9P0ZZ DRAINAGE OF LEFT LOWER LEG SUBCUTANEOUS TISSUE AND FASCIA, OPEN APPROACH: ICD-10-PCS | Performed by: SURGERY

## 2019-10-10 RX ADMIN — PREGABALIN SCH MG: 75 CAPSULE ORAL at 08:29

## 2019-10-10 RX ADMIN — ENOXAPARIN SODIUM SCH MG: 60 INJECTION SUBCUTANEOUS at 08:29

## 2019-10-10 RX ADMIN — DULOXETINE HYDROCHLORIDE SCH MG: 30 CAPSULE, DELAYED RELEASE ORAL at 08:29

## 2019-10-10 RX ADMIN — Medication PRN MG: at 15:35

## 2019-10-10 RX ADMIN — QUETIAPINE SCH MG: 25 TABLET, FILM COATED ORAL at 20:49

## 2019-10-10 RX ADMIN — PREGABALIN SCH MG: 75 CAPSULE ORAL at 16:51

## 2019-10-10 RX ADMIN — ZOLPIDEM TARTRATE PRN MG: 10 TABLET, FILM COATED ORAL at 19:44

## 2019-10-10 RX ADMIN — Medication PRN MG: at 04:17

## 2019-10-10 RX ADMIN — ENOXAPARIN SODIUM SCH MG: 60 INJECTION SUBCUTANEOUS at 20:49

## 2019-10-10 RX ADMIN — SODIUM CHLORIDE PRN MG: 900 INJECTION INTRAVENOUS at 23:45

## 2019-10-10 RX ADMIN — PANTOPRAZOLE SODIUM SCH MG: 40 TABLET, DELAYED RELEASE ORAL at 08:29

## 2019-10-10 RX ADMIN — CEFEPIME HYDROCHLORIDE SCH MLS/HR: 1 INJECTION, POWDER, FOR SOLUTION INTRAMUSCULAR; INTRAVENOUS at 23:45

## 2019-10-10 RX ADMIN — ATORVASTATIN CALCIUM SCH MG: 10 TABLET, FILM COATED ORAL at 20:49

## 2019-10-10 RX ADMIN — CEFEPIME HYDROCHLORIDE SCH MLS/HR: 1 INJECTION, POWDER, FOR SOLUTION INTRAMUSCULAR; INTRAVENOUS at 12:30

## 2019-10-10 RX ADMIN — Medication PRN MG: at 23:45

## 2019-10-10 RX ADMIN — SODIUM CHLORIDE PRN MG: 900 INJECTION INTRAVENOUS at 08:30

## 2019-10-10 RX ADMIN — Medication PRN MG: at 19:44

## 2019-10-10 RX ADMIN — SODIUM CHLORIDE PRN MG: 900 INJECTION INTRAVENOUS at 04:16

## 2019-10-10 RX ADMIN — SODIUM CHLORIDE PRN MG: 900 INJECTION INTRAVENOUS at 00:15

## 2019-10-10 RX ADMIN — AZTREONAM SCH MLS/HR: 1 INJECTION, SOLUTION INTRAVENOUS at 21:52

## 2019-10-10 RX ADMIN — SODIUM CHLORIDE PRN MG: 900 INJECTION INTRAVENOUS at 19:44

## 2019-10-10 RX ADMIN — SODIUM CHLORIDE SCH MLS/HR: 9 INJECTION, SOLUTION INTRAVENOUS at 14:05

## 2019-10-10 RX ADMIN — Medication PRN MG: at 00:16

## 2019-10-10 RX ADMIN — DULOXETINE HYDROCHLORIDE SCH MG: 30 CAPSULE, DELAYED RELEASE ORAL at 16:51

## 2019-10-10 RX ADMIN — Medication PRN MG: at 08:30

## 2019-10-10 RX ADMIN — AZTREONAM SCH MLS/HR: 1 INJECTION, SOLUTION INTRAVENOUS at 16:51

## 2019-10-10 RX ADMIN — SODIUM CHLORIDE PRN MG: 900 INJECTION INTRAVENOUS at 15:35

## 2019-10-10 NOTE — NUR
received report from PACU nurse. pt is s/p I&D of left knee stump, cultures were taken from 
the wound. iodoform packing was placed then kurlex and tape. pt's last vitals were 106/59 HR 
72, RR 18 and O2 sat 98% on room air. pt was given 1mg of dilaudid and 1gram of cefepime IV 
in PACU.

## 2019-10-10 NOTE — DISCHARGE SUMMARY
ADDENDUM:  The patient also has additional diagnosis of DVT and for that the patient was

on warfarin. 

 

PLAN:  We will give the patient Lovenox for now, and depends on the surgery schedule, it

will be put on a hold. 

 

 

 

 

______________________________

MD CONCHIS Hernandez/JB

D:  10/09/2019 10:40:18

T:  10/10/2019 07:05:53

Job #:  [id number]

## 2019-10-10 NOTE — CONSULTATION
DATE OF CONSULTATION:  10/10/2019

 

Cardiac Consult 

 

REASON FOR CONSULTATION:  Abscess. 

 

Thank you, Dr. Gracia, for asking me to see this patient.

 

HISTORY OF PRESENT ILLNESS:  The patient is a 44-year-old man, who was admitted through

the emergency department and referred for abscess.  He presented to the emergency

department on 10/08/2019 with progressive pain, swelling, and redness of the left BKA

stump associated with low-grade fever.  The patient denies trauma to the left BKA stump.

 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

February 2019, associated with L5-S1 osteomyelitis and abscess.  The patient required

surgery and IV vancomycin for 6 weeks.  In the emergency department, he was noted to

have temperature of 97.8 degrees Fahrenheit, pulse rate 82, respiratory rate 18, blood

pressure 105/72, and oxygen saturation 96% on room air.  Initial laboratory studies

showed blood leukocyte count of 9960 with 67.5% neutrophils, BUN 13, and creatinine

0.84.  CT scan of the left knee showed fluid attenuation just distal to this stump

concerning for abscess. 

 

PAST MEDICAL HISTORY:  Hypertension, hyperlipidemia, deep venous thrombosis/pulmonary

embolism, seizure disorder, peptic ulcer, anxiety, and depression. 

 

PAST SURGICAL HISTORY:  Skull fracture, right shoulder rotator cuff repair, left

superficial femoral vein thrombectomy, and right knee surgery for ACL repair. 

 

ALLERGIES:  PENICILLIN AND CODEINE.

 

MEDICATIONS:  The patient received vancomycin 1 g IV piggyback once yesterday.

 

IMMUNIZATION:  The patient has received pneumococcal vaccination in the past.  Also, he

received influenza vaccine, 10/09/2019 (yesterday). 

 

FAMILY HISTORY:  Noncontributory.

 

SOCIAL HISTORY:  The patient denies alcohol, tobacco, or recreational drug use.

 

REVIEW OF SYSTEMS:

As per history of present illness.

 

PHYSICAL EXAMINATION:

GENERAL:  No acute distress. 

VITAL SIGNS:  T-max 98, pulse rate 64, respiratory rate 20, blood pressure 112/67, and

weight 202 pounds. 

HEENT:  Normocephalic.  There is no icterus or injection of conjunctiva.  There is no

ear or nasal discharge.  Moist oral mucosa.  No pharyngeal erythema or exudate. 

NECK:  Supple.  No JVD.  No meningismus. 

LUNGS:  Clear to auscultation bilaterally. 

HEART:  Normal S1 and S2.  Regular. 

ABDOMEN:  Soft and nontender. 

EXTREMITIES:  Left BKA stump is with erythema, warm, marked tenderness, and fluctuance

of the BKA stump in relation to the tibia stump.  There is no edema, clubbing, or

cyanosis of the rest of the extremities. 

SKIN:  As per extremities. 

CNS:  Awake, alert, and oriented to person, place, and time.  Nonfocal.

LABORATORY AND DIAGNOSTICS:  WBC 6120, hemoglobin 13.4, platelet 298,000, neutrophils

51.9, lymphocytes 28.9, monocytes 12.7, eosinophils 4.2, and basophils 0.8.  BUN 8 and

creatinine 0.9. 

 

IMPRESSION:  

1. Probable left below-knee amputation stump abscess, worrisome for recurrent tibial

stump osteomyelitis. 

2. Deep venous thrombosis by history.

3. Seizure disorder.

 

PLAN:  

1. Continue vancomycin 50 mg/kg IV piggyback q.12 hours and start aztreonam 1 g IV

piggyback q.8 hours. 

2. Await Surgical consult.

 

 

 

 

______________________________

MD FLOR Kramer/JB

D:  10/10/2019 09:46:33

T:  10/10/2019 11:48:31

Job #:  [id number]

## 2019-10-10 NOTE — NUR
RECEIVED AM REPORT AND MORNING ROUNDS DONE. PT IS SLEEPING, NO S/S OF DISTRESS. CALL LIGHT 
WITHIN REACH AND SIDE RAILS ARE UP

## 2019-10-10 NOTE — OPERATIVE REPORT
DATE OF PROCEDURE:  10/10/2019

 

SURGEON:  Francisco Parra MD

 

PREOPERATIVE DIAGNOSIS:  Left below-knee amputation stump abscess.

 

POSTOPERATIVE DIAGNOSIS:  Left below-knee amputation stump abscess.

 

PROCEDURE:  Incision and drainage of left below-knee amputation abscess.

 

ASSISTANT:  None.

 

ANESTHESIA:  General.

 

INDICATIONS:  A 44-year-old male with 1-week history of left BKA stump swelling,

redness, and tenderness.  The patient consented for incision and drainage.  Attendant

risks discussed. 

 

PROCEDURE FINDINGS:  Thin and purulent collection in the left BKA stump.

 

DESCRIPTION OF PROCEDURE:  The patient was brought to the OR intubated.  The left knee

was prepped with Betadine and draped in a sterile fashion.  The area of maximal

fluctuance was found at the most anterior-superior aspect of the stump and a transverse

incision was made approximately 3 cm into the fluid collection and thin purulent fluid

was evacuated and swab specimen obtained for culture and sensitivity.  Using finger

dissection, the underlying tissue was exposed revealing the end of the tibia.  Washout

carried out with saline solution and the cavity was packed with iodoform gauze.

Hemostasis achieved.  Dressing applied with 4 x 4 and Kerlix roll.  The patient was

extubated and transported to recovery room. 

 

BLOOD LOSS:  5 mL.

 

 

 

 

______________________________

Francisco Parra MD

 

DNL/MODL

D:  10/10/2019 13:12:54

T:  10/10/2019 14:36:49

Job #:  [id number]

## 2019-10-11 VITALS — DIASTOLIC BLOOD PRESSURE: 78 MMHG | SYSTOLIC BLOOD PRESSURE: 127 MMHG

## 2019-10-11 VITALS — DIASTOLIC BLOOD PRESSURE: 97 MMHG | SYSTOLIC BLOOD PRESSURE: 163 MMHG

## 2019-10-11 VITALS — DIASTOLIC BLOOD PRESSURE: 89 MMHG | SYSTOLIC BLOOD PRESSURE: 168 MMHG

## 2019-10-11 VITALS — SYSTOLIC BLOOD PRESSURE: 168 MMHG | DIASTOLIC BLOOD PRESSURE: 89 MMHG

## 2019-10-11 VITALS — SYSTOLIC BLOOD PRESSURE: 156 MMHG | DIASTOLIC BLOOD PRESSURE: 104 MMHG

## 2019-10-11 VITALS — SYSTOLIC BLOOD PRESSURE: 138 MMHG | DIASTOLIC BLOOD PRESSURE: 83 MMHG

## 2019-10-11 RX ADMIN — SODIUM CHLORIDE PRN MG: 900 INJECTION INTRAVENOUS at 16:15

## 2019-10-11 RX ADMIN — QUETIAPINE SCH MG: 25 TABLET, FILM COATED ORAL at 20:05

## 2019-10-11 RX ADMIN — ALPRAZOLAM PRN MG: 1 TABLET ORAL at 16:15

## 2019-10-11 RX ADMIN — Medication PRN MG: at 08:14

## 2019-10-11 RX ADMIN — DULOXETINE HYDROCHLORIDE SCH MG: 30 CAPSULE, DELAYED RELEASE ORAL at 16:51

## 2019-10-11 RX ADMIN — SODIUM CHLORIDE SCH MLS/HR: 9 INJECTION, SOLUTION INTRAVENOUS at 18:00

## 2019-10-11 RX ADMIN — Medication PRN MG: at 12:00

## 2019-10-11 RX ADMIN — AZTREONAM SCH MLS/HR: 1 INJECTION, SOLUTION INTRAVENOUS at 07:30

## 2019-10-11 RX ADMIN — Medication PRN MG: at 03:45

## 2019-10-11 RX ADMIN — ATORVASTATIN CALCIUM SCH MG: 10 TABLET, FILM COATED ORAL at 20:05

## 2019-10-11 RX ADMIN — PANTOPRAZOLE SODIUM SCH MG: 40 TABLET, DELAYED RELEASE ORAL at 08:14

## 2019-10-11 RX ADMIN — AZTREONAM SCH MLS/HR: 1 INJECTION, SOLUTION INTRAVENOUS at 13:00

## 2019-10-11 RX ADMIN — SODIUM CHLORIDE PRN MG: 900 INJECTION INTRAVENOUS at 08:14

## 2019-10-11 RX ADMIN — PREGABALIN SCH MG: 75 CAPSULE ORAL at 08:14

## 2019-10-11 RX ADMIN — ENOXAPARIN SODIUM SCH MG: 60 INJECTION SUBCUTANEOUS at 08:14

## 2019-10-11 RX ADMIN — ZOLPIDEM TARTRATE PRN MG: 10 TABLET, FILM COATED ORAL at 20:05

## 2019-10-11 RX ADMIN — ENOXAPARIN SODIUM SCH MG: 60 INJECTION SUBCUTANEOUS at 20:05

## 2019-10-11 RX ADMIN — DULOXETINE HYDROCHLORIDE SCH MG: 30 CAPSULE, DELAYED RELEASE ORAL at 08:14

## 2019-10-11 RX ADMIN — SODIUM CHLORIDE PRN MG: 900 INJECTION INTRAVENOUS at 21:03

## 2019-10-11 RX ADMIN — SODIUM CHLORIDE SCH MLS/HR: 9 INJECTION, SOLUTION INTRAVENOUS at 01:41

## 2019-10-11 RX ADMIN — SODIUM CHLORIDE PRN MG: 900 INJECTION INTRAVENOUS at 12:00

## 2019-10-11 RX ADMIN — AZTREONAM SCH MLS/HR: 1 INJECTION, SOLUTION INTRAVENOUS at 22:14

## 2019-10-11 RX ADMIN — Medication PRN MG: at 16:15

## 2019-10-11 RX ADMIN — PREGABALIN SCH MG: 75 CAPSULE ORAL at 16:51

## 2019-10-11 RX ADMIN — SODIUM CHLORIDE PRN MG: 900 INJECTION INTRAVENOUS at 03:45

## 2019-10-11 RX ADMIN — Medication PRN MG: at 21:03

## 2019-10-11 NOTE — NUR
Pt changed to inpatient status. IMM letter delivered and explained to pt. He stated he knows 
his rights from previous admissions. Signed copy placed in chart. Copy to pt.

## 2019-10-11 NOTE — NUR
received am report and morning rounds done. pt is sleeping, no s/s of distress. call light 
within reach

## 2019-10-12 VITALS — SYSTOLIC BLOOD PRESSURE: 151 MMHG | DIASTOLIC BLOOD PRESSURE: 83 MMHG

## 2019-10-12 VITALS — DIASTOLIC BLOOD PRESSURE: 96 MMHG | SYSTOLIC BLOOD PRESSURE: 155 MMHG

## 2019-10-12 VITALS — SYSTOLIC BLOOD PRESSURE: 128 MMHG | DIASTOLIC BLOOD PRESSURE: 72 MMHG

## 2019-10-12 VITALS — SYSTOLIC BLOOD PRESSURE: 143 MMHG | DIASTOLIC BLOOD PRESSURE: 88 MMHG

## 2019-10-12 VITALS — SYSTOLIC BLOOD PRESSURE: 115 MMHG | DIASTOLIC BLOOD PRESSURE: 63 MMHG

## 2019-10-12 VITALS — DIASTOLIC BLOOD PRESSURE: 61 MMHG | SYSTOLIC BLOOD PRESSURE: 105 MMHG

## 2019-10-12 LAB
ALBUMIN SERPL-MCNC: 2.5 G/DL (ref 3.5–5)
ALBUMIN/GLOB SERPL: 0.8 {RATIO} (ref 0.8–2)
ALP SERPL-CCNC: 58 IU/L (ref 40–150)
ALT SERPL-CCNC: 17 IU/L (ref 0–55)
ANION GAP SERPL CALC-SCNC: 8.1 MMOL/L (ref 8–16)
BASOPHILS # BLD AUTO: 0 10*3/UL (ref 0–0.1)
BASOPHILS NFR BLD AUTO: 0.6 % (ref 0–1)
BUN SERPL-MCNC: 8 MG/DL (ref 7–26)
BUN/CREAT SERPL: 11 (ref 6–25)
CALCIUM SERPL-MCNC: 8.5 MG/DL (ref 8.4–10.2)
CHLORIDE SERPL-SCNC: 107 MMOL/L (ref 98–107)
CO2 SERPL-SCNC: 28 MMOL/L (ref 22–29)
DEPRECATED NEUTROPHILS # BLD AUTO: 1.7 10*3/UL (ref 2.1–6.9)
EGFRCR SERPLBLD CKD-EPI 2021: > 60 ML/MIN (ref 60–?)
EOSINOPHIL # BLD AUTO: 0.2 10*3/UL (ref 0–0.4)
EOSINOPHIL NFR BLD AUTO: 4.6 % (ref 0–6)
EOSINOPHIL NFR BLD MANUAL: 2 % (ref 0–7)
ERYTHROCYTE [DISTWIDTH] IN CORD BLOOD: 14.6 % (ref 11.7–14.4)
GLOBULIN PLAS-MCNC: 3.1 G/DL (ref 2.3–3.5)
GLUCOSE SERPLBLD-MCNC: 98 MG/DL (ref 74–118)
HCT VFR BLD AUTO: 38.8 % (ref 38.2–49.6)
HGB BLD-MCNC: 12.8 G/DL (ref 14–18)
LYMPHOCYTES # BLD: 2.4 10*3/UL (ref 1–3.2)
LYMPHOCYTES NFR BLD AUTO: 47.1 % (ref 18–39.1)
LYMPHOCYTES NFR BLD MANUAL: 40 % (ref 19–48)
MCH RBC QN AUTO: 31.5 PG (ref 28–32)
MCHC RBC AUTO-ENTMCNC: 33 G/DL (ref 31–35)
MCV RBC AUTO: 95.6 FL (ref 81–99)
MONOCYTES # BLD AUTO: 0.7 10*3/UL (ref 0.2–0.8)
MONOCYTES NFR BLD AUTO: 14.1 % (ref 4.4–11.3)
MONOCYTES NFR BLD MANUAL: 18 % (ref 3.4–9)
NEUTS BAND NFR BLD MANUAL: 2 %
NEUTS SEG NFR BLD AUTO: 33.4 % (ref 38.7–80)
NEUTS SEG NFR BLD MANUAL: 37 % (ref 40–74)
PLAT MORPH BLD: NORMAL
PLATELET # BLD AUTO: 295 X10E3/UL (ref 140–360)
PLATELET # BLD EST: ADEQUATE 10*3/UL
POTASSIUM SERPL-SCNC: 4.1 MMOL/L (ref 3.5–5.1)
RBC # BLD AUTO: 4.06 X10E6/UL (ref 4.3–5.7)
RBC MORPH BLD: NORMAL
SODIUM SERPL-SCNC: 139 MMOL/L (ref 136–145)

## 2019-10-12 RX ADMIN — Medication PRN MG: at 14:36

## 2019-10-12 RX ADMIN — Medication PRN MG: at 02:00

## 2019-10-12 RX ADMIN — ENOXAPARIN SODIUM SCH MG: 60 INJECTION SUBCUTANEOUS at 20:04

## 2019-10-12 RX ADMIN — Medication PRN MG: at 06:15

## 2019-10-12 RX ADMIN — ATORVASTATIN CALCIUM SCH MG: 10 TABLET, FILM COATED ORAL at 20:04

## 2019-10-12 RX ADMIN — Medication PRN MG: at 23:34

## 2019-10-12 RX ADMIN — SODIUM CHLORIDE PRN MG: 900 INJECTION INTRAVENOUS at 02:00

## 2019-10-12 RX ADMIN — DULOXETINE HYDROCHLORIDE SCH MG: 30 CAPSULE, DELAYED RELEASE ORAL at 09:25

## 2019-10-12 RX ADMIN — AZTREONAM SCH MLS/HR: 1 INJECTION, SOLUTION INTRAVENOUS at 05:30

## 2019-10-12 RX ADMIN — PANTOPRAZOLE SODIUM SCH MG: 40 TABLET, DELAYED RELEASE ORAL at 10:34

## 2019-10-12 RX ADMIN — SODIUM CHLORIDE SCH MLS/HR: 9 INJECTION, SOLUTION INTRAVENOUS at 10:34

## 2019-10-12 RX ADMIN — ENOXAPARIN SODIUM SCH MG: 60 INJECTION SUBCUTANEOUS at 09:25

## 2019-10-12 RX ADMIN — SODIUM CHLORIDE PRN MG: 900 INJECTION INTRAVENOUS at 23:34

## 2019-10-12 RX ADMIN — PREGABALIN SCH MG: 75 CAPSULE ORAL at 17:36

## 2019-10-12 RX ADMIN — SODIUM CHLORIDE PRN MG: 900 INJECTION INTRAVENOUS at 18:32

## 2019-10-12 RX ADMIN — SODIUM CHLORIDE SCH MLS/HR: 9 INJECTION, SOLUTION INTRAVENOUS at 01:36

## 2019-10-12 RX ADMIN — QUETIAPINE SCH MG: 25 TABLET, FILM COATED ORAL at 20:04

## 2019-10-12 RX ADMIN — Medication PRN MG: at 18:32

## 2019-10-12 RX ADMIN — PREGABALIN SCH MG: 75 CAPSULE ORAL at 09:25

## 2019-10-12 RX ADMIN — SODIUM CHLORIDE SCH MLS/HR: 9 INJECTION, SOLUTION INTRAVENOUS at 20:23

## 2019-10-12 RX ADMIN — SODIUM CHLORIDE SCH MLS/HR: 9 INJECTION, SOLUTION INTRAVENOUS at 06:23

## 2019-10-12 RX ADMIN — SODIUM CHLORIDE SCH MLS/HR: 9 INJECTION, SOLUTION INTRAVENOUS at 17:36

## 2019-10-12 RX ADMIN — SODIUM CHLORIDE PRN MG: 900 INJECTION INTRAVENOUS at 14:36

## 2019-10-12 RX ADMIN — PANTOPRAZOLE SODIUM SCH MG: 40 TABLET, DELAYED RELEASE ORAL at 07:30

## 2019-10-12 RX ADMIN — DULOXETINE HYDROCHLORIDE SCH MG: 30 CAPSULE, DELAYED RELEASE ORAL at 17:36

## 2019-10-12 RX ADMIN — Medication PRN MG: at 10:34

## 2019-10-12 RX ADMIN — ALPRAZOLAM PRN MG: 1 TABLET ORAL at 10:40

## 2019-10-12 RX ADMIN — ZOLPIDEM TARTRATE PRN MG: 10 TABLET, FILM COATED ORAL at 20:04

## 2019-10-12 RX ADMIN — SODIUM CHLORIDE PRN MG: 900 INJECTION INTRAVENOUS at 06:15

## 2019-10-12 NOTE — NUR
Patient alert and responsive, no resp distress, rounds by ID and surgeon, changed abx and 
vanco trough added, will monitor.

## 2019-10-12 NOTE — NUR
Patient alert and stable, vanco trough pending draw, call to Lab and they are running 
behind, will get to draw him soon. Patient medicated for pain

## 2019-10-12 NOTE — NUR
LEFT STUMP'S DRESSING FELL OFF AGAIN. COMPETED DRESSING CHANGE TO LEFT STUMP. EDUCATED 
PATIENT NOT TO TOUCH DRESSING. PATIENT VERBALIZE UNDERSTANDING

## 2019-10-13 VITALS — SYSTOLIC BLOOD PRESSURE: 148 MMHG | DIASTOLIC BLOOD PRESSURE: 96 MMHG

## 2019-10-13 VITALS — DIASTOLIC BLOOD PRESSURE: 92 MMHG | SYSTOLIC BLOOD PRESSURE: 139 MMHG

## 2019-10-13 VITALS — SYSTOLIC BLOOD PRESSURE: 134 MMHG | DIASTOLIC BLOOD PRESSURE: 78 MMHG

## 2019-10-13 VITALS — DIASTOLIC BLOOD PRESSURE: 64 MMHG | SYSTOLIC BLOOD PRESSURE: 90 MMHG

## 2019-10-13 VITALS — DIASTOLIC BLOOD PRESSURE: 79 MMHG | SYSTOLIC BLOOD PRESSURE: 118 MMHG

## 2019-10-13 VITALS — SYSTOLIC BLOOD PRESSURE: 140 MMHG | DIASTOLIC BLOOD PRESSURE: 79 MMHG

## 2019-10-13 VITALS — DIASTOLIC BLOOD PRESSURE: 96 MMHG | SYSTOLIC BLOOD PRESSURE: 148 MMHG

## 2019-10-13 LAB
ANION GAP SERPL CALC-SCNC: 12 MMOL/L (ref 8–16)
BUN SERPL-MCNC: 10 MG/DL (ref 7–26)
BUN/CREAT SERPL: 13 (ref 6–25)
CALCIUM SERPL-MCNC: 8.9 MG/DL (ref 8.4–10.2)
CHLORIDE SERPL-SCNC: 105 MMOL/L (ref 98–107)
CO2 SERPL-SCNC: 27 MMOL/L (ref 22–29)
EGFRCR SERPLBLD CKD-EPI 2021: > 60 ML/MIN (ref 60–?)
GLUCOSE SERPLBLD-MCNC: 83 MG/DL (ref 74–118)
POTASSIUM SERPL-SCNC: 4 MMOL/L (ref 3.5–5.1)
SODIUM SERPL-SCNC: 140 MMOL/L (ref 136–145)

## 2019-10-13 RX ADMIN — ZOLPIDEM TARTRATE PRN MG: 10 TABLET, FILM COATED ORAL at 19:50

## 2019-10-13 RX ADMIN — SODIUM CHLORIDE SCH MLS/HR: 9 INJECTION, SOLUTION INTRAVENOUS at 06:48

## 2019-10-13 RX ADMIN — Medication PRN MG: at 04:25

## 2019-10-13 RX ADMIN — PREGABALIN SCH MG: 75 CAPSULE ORAL at 17:12

## 2019-10-13 RX ADMIN — POLYETHYLENE GLYCOL 3350 SCH GM: 17 POWDER, FOR SOLUTION ORAL at 08:04

## 2019-10-13 RX ADMIN — SODIUM CHLORIDE PRN MG: 900 INJECTION INTRAVENOUS at 08:36

## 2019-10-13 RX ADMIN — Medication PRN MG: at 08:36

## 2019-10-13 RX ADMIN — SODIUM CHLORIDE SCH MLS/HR: 9 INJECTION, SOLUTION INTRAVENOUS at 18:00

## 2019-10-13 RX ADMIN — SODIUM CHLORIDE PRN MG: 900 INJECTION INTRAVENOUS at 12:47

## 2019-10-13 RX ADMIN — ENOXAPARIN SODIUM SCH MG: 60 INJECTION SUBCUTANEOUS at 20:24

## 2019-10-13 RX ADMIN — SODIUM CHLORIDE SCH MLS/HR: 9 INJECTION, SOLUTION INTRAVENOUS at 08:30

## 2019-10-13 RX ADMIN — QUETIAPINE SCH MG: 25 TABLET, FILM COATED ORAL at 20:24

## 2019-10-13 RX ADMIN — ALPRAZOLAM PRN MG: 1 TABLET ORAL at 12:15

## 2019-10-13 RX ADMIN — Medication PRN MG: at 12:47

## 2019-10-13 RX ADMIN — ATORVASTATIN CALCIUM SCH MG: 10 TABLET, FILM COATED ORAL at 20:24

## 2019-10-13 RX ADMIN — Medication PRN MG: at 16:45

## 2019-10-13 RX ADMIN — Medication PRN MG: at 21:00

## 2019-10-13 RX ADMIN — SODIUM CHLORIDE PRN MG: 900 INJECTION INTRAVENOUS at 21:00

## 2019-10-13 RX ADMIN — DULOXETINE HYDROCHLORIDE SCH MG: 30 CAPSULE, DELAYED RELEASE ORAL at 17:12

## 2019-10-13 RX ADMIN — PREGABALIN SCH MG: 75 CAPSULE ORAL at 08:04

## 2019-10-13 RX ADMIN — DULOXETINE HYDROCHLORIDE SCH MG: 30 CAPSULE, DELAYED RELEASE ORAL at 08:04

## 2019-10-13 RX ADMIN — SODIUM CHLORIDE PRN MG: 900 INJECTION INTRAVENOUS at 04:25

## 2019-10-13 RX ADMIN — ENOXAPARIN SODIUM SCH MG: 60 INJECTION SUBCUTANEOUS at 08:04

## 2019-10-13 RX ADMIN — SODIUM CHLORIDE SCH MLS/HR: 9 INJECTION, SOLUTION INTRAVENOUS at 00:15

## 2019-10-13 NOTE — NUR
Patient a/ox3, pains well managed and medicated q4 hours per patient request and PRN, IV abx 
running at this time, tolerating well, will monitor, call light within reach, bed in low and 
locked position

## 2019-10-14 VITALS — SYSTOLIC BLOOD PRESSURE: 139 MMHG | DIASTOLIC BLOOD PRESSURE: 80 MMHG

## 2019-10-14 VITALS — SYSTOLIC BLOOD PRESSURE: 103 MMHG | DIASTOLIC BLOOD PRESSURE: 66 MMHG

## 2019-10-14 VITALS — SYSTOLIC BLOOD PRESSURE: 97 MMHG | DIASTOLIC BLOOD PRESSURE: 58 MMHG

## 2019-10-14 VITALS — SYSTOLIC BLOOD PRESSURE: 119 MMHG | DIASTOLIC BLOOD PRESSURE: 79 MMHG

## 2019-10-14 VITALS — SYSTOLIC BLOOD PRESSURE: 107 MMHG | DIASTOLIC BLOOD PRESSURE: 68 MMHG

## 2019-10-14 VITALS — SYSTOLIC BLOOD PRESSURE: 114 MMHG | DIASTOLIC BLOOD PRESSURE: 85 MMHG

## 2019-10-14 VITALS — DIASTOLIC BLOOD PRESSURE: 58 MMHG | SYSTOLIC BLOOD PRESSURE: 97 MMHG

## 2019-10-14 RX ADMIN — Medication PRN MG: at 08:22

## 2019-10-14 RX ADMIN — Medication PRN MG: at 04:40

## 2019-10-14 RX ADMIN — SODIUM CHLORIDE SCH MLS/HR: 9 INJECTION, SOLUTION INTRAVENOUS at 18:30

## 2019-10-14 RX ADMIN — Medication PRN MG: at 16:30

## 2019-10-14 RX ADMIN — SODIUM CHLORIDE PRN MG: 900 INJECTION INTRAVENOUS at 04:40

## 2019-10-14 RX ADMIN — SODIUM CHLORIDE PRN MG: 900 INJECTION INTRAVENOUS at 08:22

## 2019-10-14 RX ADMIN — QUETIAPINE SCH MG: 25 TABLET, FILM COATED ORAL at 20:55

## 2019-10-14 RX ADMIN — ALPRAZOLAM PRN MG: 1 TABLET ORAL at 12:22

## 2019-10-14 RX ADMIN — SODIUM CHLORIDE SCH MLS/HR: 9 INJECTION, SOLUTION INTRAVENOUS at 05:00

## 2019-10-14 RX ADMIN — DULOXETINE HYDROCHLORIDE SCH MG: 30 CAPSULE, DELAYED RELEASE ORAL at 17:00

## 2019-10-14 RX ADMIN — ENOXAPARIN SODIUM SCH MG: 60 INJECTION SUBCUTANEOUS at 08:54

## 2019-10-14 RX ADMIN — DULOXETINE HYDROCHLORIDE SCH MG: 30 CAPSULE, DELAYED RELEASE ORAL at 08:54

## 2019-10-14 RX ADMIN — PANTOPRAZOLE SODIUM SCH MG: 40 TABLET, DELAYED RELEASE ORAL at 07:30

## 2019-10-14 RX ADMIN — Medication PRN MG: at 20:40

## 2019-10-14 RX ADMIN — PREGABALIN SCH MG: 75 CAPSULE ORAL at 08:54

## 2019-10-14 RX ADMIN — SODIUM CHLORIDE PRN MG: 900 INJECTION INTRAVENOUS at 20:40

## 2019-10-14 RX ADMIN — POLYETHYLENE GLYCOL 3350 SCH GM: 17 POWDER, FOR SOLUTION ORAL at 08:54

## 2019-10-14 RX ADMIN — ATORVASTATIN CALCIUM SCH MG: 10 TABLET, FILM COATED ORAL at 20:55

## 2019-10-14 RX ADMIN — PREGABALIN SCH MG: 75 CAPSULE ORAL at 17:00

## 2019-10-14 RX ADMIN — SODIUM CHLORIDE PRN MG: 900 INJECTION INTRAVENOUS at 16:30

## 2019-10-14 RX ADMIN — ZOLPIDEM TARTRATE PRN MG: 10 TABLET, FILM COATED ORAL at 19:44

## 2019-10-14 RX ADMIN — Medication PRN MG: at 12:22

## 2019-10-14 NOTE — NUR
RCD PT AT BED PT IS ALERT AND ORIENTED PT RESTING ON BED NO SIGNS OF ANY DISTRESS NOTED  BED 
LOW AND LOCKED CALL LIGHT IN REACH

## 2019-10-14 NOTE — DIAGNOSTIC IMAGING REPORT
TECHNIQUE:

Magnetic resonance imaging of the LEFT KNEE was performed without and with

injected contrast. 



HISTORY:  Cellulitis

COMPARISON:  None available.



FINDINGS: 



Below the knee amputation. Soft tissue ulceration of the distal stump with

ill-defined 4 cm fluid collection adjacent to the ulceration with adjacent

inflammatory change.



Mild signal abnormality within the distal tibial stump without confluent T1

replacement.



Subcutaneous edema in the distal lower extremity.





IMPRESSION: 



Soft tissue ulceration of the distal stump.



Ill-defined 4 cm fluid collection distal to the stump may reflect

abscess/phlegmon.



Low probability osteomyelitis.



Signed by: Dr. Andrew Palisch, M.D. on 10/14/2019 8:29 AM

## 2019-10-15 VITALS — SYSTOLIC BLOOD PRESSURE: 160 MMHG | DIASTOLIC BLOOD PRESSURE: 88 MMHG

## 2019-10-15 VITALS — DIASTOLIC BLOOD PRESSURE: 73 MMHG | SYSTOLIC BLOOD PRESSURE: 139 MMHG

## 2019-10-15 VITALS — DIASTOLIC BLOOD PRESSURE: 61 MMHG | SYSTOLIC BLOOD PRESSURE: 139 MMHG

## 2019-10-15 VITALS — DIASTOLIC BLOOD PRESSURE: 79 MMHG | SYSTOLIC BLOOD PRESSURE: 146 MMHG

## 2019-10-15 VITALS — DIASTOLIC BLOOD PRESSURE: 70 MMHG | SYSTOLIC BLOOD PRESSURE: 120 MMHG

## 2019-10-15 VITALS — DIASTOLIC BLOOD PRESSURE: 88 MMHG | SYSTOLIC BLOOD PRESSURE: 160 MMHG

## 2019-10-15 VITALS — DIASTOLIC BLOOD PRESSURE: 82 MMHG | SYSTOLIC BLOOD PRESSURE: 121 MMHG

## 2019-10-15 LAB
BASOPHILS # BLD AUTO: 0.1 10*3/UL (ref 0–0.1)
BASOPHILS NFR BLD AUTO: 0.7 % (ref 0–1)
DEPRECATED NEUTROPHILS # BLD AUTO: 3.4 10*3/UL (ref 2.1–6.9)
EOSINOPHIL # BLD AUTO: 0.2 10*3/UL (ref 0–0.4)
EOSINOPHIL NFR BLD AUTO: 2.3 % (ref 0–6)
ERYTHROCYTE [DISTWIDTH] IN CORD BLOOD: 14.3 % (ref 11.7–14.4)
HCT VFR BLD AUTO: 38.8 % (ref 38.2–49.6)
HGB BLD-MCNC: 12.7 G/DL (ref 14–18)
LYMPHOCYTES # BLD: 2.6 10*3/UL (ref 1–3.2)
LYMPHOCYTES NFR BLD AUTO: 37 % (ref 18–39.1)
MCH RBC QN AUTO: 31.1 PG (ref 28–32)
MCHC RBC AUTO-ENTMCNC: 32.7 G/DL (ref 31–35)
MCV RBC AUTO: 95.1 FL (ref 81–99)
MONOCYTES # BLD AUTO: 0.7 10*3/UL (ref 0.2–0.8)
MONOCYTES NFR BLD AUTO: 9.7 % (ref 4.4–11.3)
NEUTS SEG NFR BLD AUTO: 49.7 % (ref 38.7–80)
PLATELET # BLD AUTO: 278 X10E3/UL (ref 140–360)
RBC # BLD AUTO: 4.08 X10E6/UL (ref 4.3–5.7)

## 2019-10-15 RX ADMIN — PREGABALIN SCH MG: 75 CAPSULE ORAL at 11:15

## 2019-10-15 RX ADMIN — SODIUM CHLORIDE SCH MLS/HR: 9 INJECTION, SOLUTION INTRAVENOUS at 18:00

## 2019-10-15 RX ADMIN — POLYETHYLENE GLYCOL 3350 SCH GM: 17 POWDER, FOR SOLUTION ORAL at 16:30

## 2019-10-15 RX ADMIN — POLYETHYLENE GLYCOL 3350 SCH GM: 17 POWDER, FOR SOLUTION ORAL at 09:00

## 2019-10-15 RX ADMIN — SODIUM CHLORIDE PRN MG: 900 INJECTION INTRAVENOUS at 11:31

## 2019-10-15 RX ADMIN — SODIUM CHLORIDE PRN MG: 900 INJECTION INTRAVENOUS at 05:10

## 2019-10-15 RX ADMIN — DULOXETINE HYDROCHLORIDE SCH MG: 30 CAPSULE, DELAYED RELEASE ORAL at 09:00

## 2019-10-15 RX ADMIN — DULOXETINE HYDROCHLORIDE SCH MG: 30 CAPSULE, DELAYED RELEASE ORAL at 11:15

## 2019-10-15 RX ADMIN — Medication PRN MG: at 00:35

## 2019-10-15 RX ADMIN — QUETIAPINE SCH MG: 25 TABLET, FILM COATED ORAL at 20:15

## 2019-10-15 RX ADMIN — SODIUM CHLORIDE SCH MLS/HR: 9 INJECTION, SOLUTION INTRAVENOUS at 06:12

## 2019-10-15 RX ADMIN — ZOLPIDEM TARTRATE PRN MG: 10 TABLET, FILM COATED ORAL at 20:15

## 2019-10-15 RX ADMIN — ATORVASTATIN CALCIUM SCH MG: 10 TABLET, FILM COATED ORAL at 20:15

## 2019-10-15 RX ADMIN — SODIUM CHLORIDE PRN MG: 900 INJECTION INTRAVENOUS at 00:35

## 2019-10-15 RX ADMIN — PREGABALIN SCH MG: 75 CAPSULE ORAL at 16:35

## 2019-10-15 RX ADMIN — Medication PRN MG: at 05:10

## 2019-10-15 RX ADMIN — Medication PRN MG: at 15:49

## 2019-10-15 RX ADMIN — SODIUM CHLORIDE PRN MG: 900 INJECTION INTRAVENOUS at 15:49

## 2019-10-15 RX ADMIN — PANTOPRAZOLE SODIUM SCH MG: 40 TABLET, DELAYED RELEASE ORAL at 07:30

## 2019-10-15 RX ADMIN — DULOXETINE HYDROCHLORIDE SCH MG: 30 CAPSULE, DELAYED RELEASE ORAL at 16:35

## 2019-10-15 RX ADMIN — PREGABALIN SCH MG: 75 CAPSULE ORAL at 09:00

## 2019-10-15 RX ADMIN — Medication PRN MG: at 20:15

## 2019-10-15 RX ADMIN — Medication PRN MG: at 11:30

## 2019-10-15 RX ADMIN — ALPRAZOLAM PRN MG: 1 TABLET ORAL at 12:00

## 2019-10-15 RX ADMIN — PANTOPRAZOLE SODIUM SCH MG: 40 TABLET, DELAYED RELEASE ORAL at 11:15

## 2019-10-15 NOTE — NUR
RCD PT AT BED PT IS ALERT AND ORIENTED PT RESTING ON BED NO SIGNS OF ANY DISTRESS NOTED PT 
NPO FOR PROCEDURE  BED LOW AND LOCKED CALL LIGHT IN REACH

## 2019-10-15 NOTE — NUR
WOUND CARE CONSULT DONE 10-14-19 FOR 43 YO MALE  HX OF LEFT BKA STUMP CELLULITIS AND ABSCESS

AWILDA 22 PUP CONSERVATIVE

LABS: WBC-5,HGB-12.8,GLUCOSE-83

LFT KNEE POSITIVE FOR STAPHYLOCOCCUS AUREUS 10/10 CULTURE

MRI SHOWS DECREASED PROBABLE OSTEO

DR CHANDLER CONSULTED FOR SURGICAL INTERVENTION 

RECOMMENDATIONS: NURSING TO CONTINUE DAILY CLEANING WITH NS AND  IODOFORM PACKING COVER WITH 
4X4 SECURE WITH COBAN TO LFT BKA OPEN ABSCESS UNTIL FURTHER ORDERS BY DR CHANDLER 

NURSING TO RECONSULT WOUND CARE FOR ANY FURTHER CONCERNS OR TREATMENT NEEDS 

-------------------------------------------------------------------------------

Addendum: 10/15/19 at 1140 by Harsha Hutton RN

-------------------------------------------------------------------------------

Amended: Links added.

## 2019-10-15 NOTE — NUR
Walking rounds and received report. Patient alert and oriented x3. No signs of distress 
noted with patient. Patient call bell within reach, bed in lowest position, bed alarm 
activated. Patient instructed to call for assistance with ambulation.

## 2019-10-16 VITALS — SYSTOLIC BLOOD PRESSURE: 112 MMHG | DIASTOLIC BLOOD PRESSURE: 81 MMHG

## 2019-10-16 VITALS — DIASTOLIC BLOOD PRESSURE: 88 MMHG | SYSTOLIC BLOOD PRESSURE: 135 MMHG

## 2019-10-16 VITALS — DIASTOLIC BLOOD PRESSURE: 78 MMHG | SYSTOLIC BLOOD PRESSURE: 150 MMHG

## 2019-10-16 VITALS — SYSTOLIC BLOOD PRESSURE: 142 MMHG | DIASTOLIC BLOOD PRESSURE: 82 MMHG

## 2019-10-16 VITALS — SYSTOLIC BLOOD PRESSURE: 131 MMHG | DIASTOLIC BLOOD PRESSURE: 82 MMHG

## 2019-10-16 VITALS — DIASTOLIC BLOOD PRESSURE: 82 MMHG | SYSTOLIC BLOOD PRESSURE: 131 MMHG

## 2019-10-16 VITALS — DIASTOLIC BLOOD PRESSURE: 61 MMHG | SYSTOLIC BLOOD PRESSURE: 121 MMHG

## 2019-10-16 LAB
ALBUMIN SERPL-MCNC: 3 G/DL (ref 3.5–5)
ALBUMIN/GLOB SERPL: 0.9 {RATIO} (ref 0.8–2)
ALP SERPL-CCNC: 79 IU/L (ref 40–150)
ALT SERPL-CCNC: 70 IU/L (ref 0–55)
ANION GAP SERPL CALC-SCNC: 12.9 MMOL/L (ref 8–16)
BASOPHILS # BLD AUTO: 0 10*3/UL (ref 0–0.1)
BASOPHILS NFR BLD AUTO: 0.4 % (ref 0–1)
BUN SERPL-MCNC: 8 MG/DL (ref 7–26)
BUN/CREAT SERPL: 9 (ref 6–25)
CALCIUM SERPL-MCNC: 9.5 MG/DL (ref 8.4–10.2)
CHLORIDE SERPL-SCNC: 106 MMOL/L (ref 98–107)
CO2 SERPL-SCNC: 26 MMOL/L (ref 22–29)
DEPRECATED NEUTROPHILS # BLD AUTO: 3.3 10*3/UL (ref 2.1–6.9)
EGFRCR SERPLBLD CKD-EPI 2021: > 60 ML/MIN (ref 60–?)
EOSINOPHIL # BLD AUTO: 0.2 10*3/UL (ref 0–0.4)
EOSINOPHIL NFR BLD AUTO: 2.5 % (ref 0–6)
ERYTHROCYTE [DISTWIDTH] IN CORD BLOOD: 14.4 % (ref 11.7–14.4)
GLOBULIN PLAS-MCNC: 3.3 G/DL (ref 2.3–3.5)
GLUCOSE SERPLBLD-MCNC: 79 MG/DL (ref 74–118)
HCT VFR BLD AUTO: 40.2 % (ref 38.2–49.6)
HGB BLD-MCNC: 13.5 G/DL (ref 14–18)
LYMPHOCYTES # BLD: 2.6 10*3/UL (ref 1–3.2)
LYMPHOCYTES NFR BLD AUTO: 38.1 % (ref 18–39.1)
MCH RBC QN AUTO: 31.6 PG (ref 28–32)
MCHC RBC AUTO-ENTMCNC: 33.6 G/DL (ref 31–35)
MCV RBC AUTO: 94.1 FL (ref 81–99)
MONOCYTES # BLD AUTO: 0.7 10*3/UL (ref 0.2–0.8)
MONOCYTES NFR BLD AUTO: 10.4 % (ref 4.4–11.3)
NEUTS SEG NFR BLD AUTO: 48.2 % (ref 38.7–80)
PLATELET # BLD AUTO: 295 X10E3/UL (ref 140–360)
POTASSIUM SERPL-SCNC: 3.9 MMOL/L (ref 3.5–5.1)
RBC # BLD AUTO: 4.27 X10E6/UL (ref 4.3–5.7)
SODIUM SERPL-SCNC: 141 MMOL/L (ref 136–145)

## 2019-10-16 PROCEDURE — 0QBH0ZZ EXCISION OF LEFT TIBIA, OPEN APPROACH: ICD-10-PCS | Performed by: SURGERY

## 2019-10-16 RX ADMIN — HYDROMORPHONE HYDROCHLORIDE ONE MG: 2 INJECTION INTRAMUSCULAR; INTRAVENOUS; SUBCUTANEOUS at 08:58

## 2019-10-16 RX ADMIN — HYDROMORPHONE HYDROCHLORIDE ONE MG: 2 INJECTION INTRAMUSCULAR; INTRAVENOUS; SUBCUTANEOUS at 09:14

## 2019-10-16 RX ADMIN — Medication PRN MG: at 17:30

## 2019-10-16 RX ADMIN — MORPHINE SULFATE ONE MG: 10 INJECTION INTRAMUSCULAR; INTRAVENOUS; SUBCUTANEOUS at 10:17

## 2019-10-16 RX ADMIN — HYDROMORPHONE HYDROCHLORIDE ONE MG: 2 INJECTION INTRAMUSCULAR; INTRAVENOUS; SUBCUTANEOUS at 10:41

## 2019-10-16 RX ADMIN — PANTOPRAZOLE SODIUM SCH MG: 40 TABLET, DELAYED RELEASE ORAL at 12:24

## 2019-10-16 RX ADMIN — SODIUM CHLORIDE SCH MLS/HR: 9 INJECTION, SOLUTION INTRAVENOUS at 05:35

## 2019-10-16 RX ADMIN — DULOXETINE HYDROCHLORIDE SCH MG: 30 CAPSULE, DELAYED RELEASE ORAL at 12:23

## 2019-10-16 RX ADMIN — MORPHINE SULFATE ONE MG: 10 INJECTION INTRAMUSCULAR; INTRAVENOUS; SUBCUTANEOUS at 09:08

## 2019-10-16 RX ADMIN — ATORVASTATIN CALCIUM SCH MG: 10 TABLET, FILM COATED ORAL at 21:00

## 2019-10-16 RX ADMIN — SODIUM CHLORIDE SCH MLS/HR: 9 INJECTION, SOLUTION INTRAVENOUS at 18:24

## 2019-10-16 RX ADMIN — QUETIAPINE SCH MG: 25 TABLET, FILM COATED ORAL at 21:00

## 2019-10-16 RX ADMIN — MORPHINE SULFATE ONE MG: 10 INJECTION INTRAMUSCULAR; INTRAVENOUS; SUBCUTANEOUS at 10:42

## 2019-10-16 RX ADMIN — Medication PRN MG: at 22:19

## 2019-10-16 RX ADMIN — SODIUM CHLORIDE PRN MG: 900 INJECTION INTRAVENOUS at 01:07

## 2019-10-16 RX ADMIN — MIDAZOLAM HYDROCHLORIDE ONE MG: 1 INJECTION INTRAMUSCULAR; INTRAVENOUS at 10:41

## 2019-10-16 RX ADMIN — DULOXETINE HYDROCHLORIDE SCH MG: 30 CAPSULE, DELAYED RELEASE ORAL at 17:23

## 2019-10-16 RX ADMIN — MORPHINE SULFATE ONE MG: 10 INJECTION INTRAMUSCULAR; INTRAVENOUS; SUBCUTANEOUS at 10:41

## 2019-10-16 RX ADMIN — Medication PRN MG: at 13:33

## 2019-10-16 RX ADMIN — ALPRAZOLAM PRN MG: 1 TABLET ORAL at 12:28

## 2019-10-16 RX ADMIN — Medication PRN MG: at 05:20

## 2019-10-16 RX ADMIN — POLYETHYLENE GLYCOL 3350 SCH GM: 17 POWDER, FOR SOLUTION ORAL at 12:24

## 2019-10-16 RX ADMIN — Medication PRN MG: at 22:15

## 2019-10-16 RX ADMIN — Medication PRN MG: at 01:06

## 2019-10-16 RX ADMIN — MIDAZOLAM HYDROCHLORIDE ONE MG: 1 INJECTION INTRAMUSCULAR; INTRAVENOUS at 09:14

## 2019-10-16 RX ADMIN — PREGABALIN SCH MG: 75 CAPSULE ORAL at 12:23

## 2019-10-16 NOTE — OPERATIVE REPORT
DATE OF PROCEDURE:  10/16/2019

 

SURGEON:  Francisco Parra MD

 

PREOPERATIVE DIAGNOSIS:  Infected left below-knee amputation stump.

 

POSTOPERATIVE DIAGNOSIS:  Infected left below-knee amputation stump.

 

PROCEDURE:  Revision of left below-knee amputation stump.

 

ASSISTANT:  None.

 

ANESTHESIA:  General, Dr. Vargas.

 

INDICATION:  A 44-year-old male with history of infection at the left BKA stump with

abscess formation.  The patient underwent drainage last week.  The patient then

consented for revisions of the left BKA stump due to protruding tibia. 

 

DESCRIPTION OF PROCEDURE:  The patient was brought to the OR intubated.  The left BKA

stump was prepped with Betadine and draped in a sterile fashion.  The transverse open

wound at the BKA stump from prior drainage procedure was extended laterally on both

sides of the side of the lower leg.  Dissection was carried through full thickness of

soft tissue down to the tibia.  The tibial bone was circumferentially isolated and the

periosteum elevator was used to strip the tissue from the bones for approximately 1 inch

and a half, and using a Gigli saw, the tibial end was removed.  The grasper was used to

smoothened the edge of the tibia.  Hemostasis was achieved.  We then irrigated the wound

and approximated the fascia with interrupted 2-0 Vicryl.  A quarter inch Penrose drain

was placed in the wound and exited laterally.  The skin was closed with vertical

mattress stitches of 2-0 nylon.  Dressing was applied.  The patient was then extubated

and transported to recovery room. 

 

ESTIMATED BLOOD LOSS:  20 mL.

 

 

 

 

______________________________

Francisco Parra MD

 

DNL/MODL

D:  10/16/2019 08:56:22

T:  10/16/2019 15:05:46

Job #:  [id number]

## 2019-10-16 NOTE — NUR
Patient received lying in bed. AAO x 3. Patient had no complaints of pain. Respirations even 
and non-labored. Wound dressing on left BKA clean, dry and intact. Fall precautions 
implemented. Patient instructed to call for assistance when needed. Call light within reach.

## 2019-10-16 NOTE — NUR
Patient complained of Morphine 4 mg IV not being effective for his pain. Dr. Fidel leslie. New 
order received for Dilaudid 1 mg Q4H PRN and to discontinue Morphine 4mg . Order received to 
reinstate Ambien 10 mg PO which had dropped off.

## 2019-10-17 VITALS — SYSTOLIC BLOOD PRESSURE: 142 MMHG | DIASTOLIC BLOOD PRESSURE: 94 MMHG

## 2019-10-17 VITALS — DIASTOLIC BLOOD PRESSURE: 84 MMHG | SYSTOLIC BLOOD PRESSURE: 157 MMHG

## 2019-10-17 VITALS — DIASTOLIC BLOOD PRESSURE: 81 MMHG | SYSTOLIC BLOOD PRESSURE: 150 MMHG

## 2019-10-17 VITALS — DIASTOLIC BLOOD PRESSURE: 63 MMHG | SYSTOLIC BLOOD PRESSURE: 141 MMHG

## 2019-10-17 VITALS — SYSTOLIC BLOOD PRESSURE: 98 MMHG | DIASTOLIC BLOOD PRESSURE: 53 MMHG

## 2019-10-17 VITALS — SYSTOLIC BLOOD PRESSURE: 141 MMHG | DIASTOLIC BLOOD PRESSURE: 63 MMHG

## 2019-10-17 VITALS — SYSTOLIC BLOOD PRESSURE: 124 MMHG | DIASTOLIC BLOOD PRESSURE: 74 MMHG

## 2019-10-17 RX ADMIN — SODIUM CHLORIDE SCH MLS/HR: 9 INJECTION, SOLUTION INTRAVENOUS at 06:10

## 2019-10-17 RX ADMIN — HYDROMORPHONE HYDROCHLORIDE PRN MG: 1 INJECTION, SOLUTION INTRAMUSCULAR; INTRAVENOUS; SUBCUTANEOUS at 02:45

## 2019-10-17 RX ADMIN — HYDROCODONE BITARTRATE AND ACETAMINOPHEN PRN EA: 7.5; 325 TABLET ORAL at 17:54

## 2019-10-17 RX ADMIN — PANTOPRAZOLE SODIUM SCH MG: 40 TABLET, DELAYED RELEASE ORAL at 07:58

## 2019-10-17 RX ADMIN — HYDROMORPHONE HYDROCHLORIDE PRN MG: 1 INJECTION, SOLUTION INTRAMUSCULAR; INTRAVENOUS; SUBCUTANEOUS at 10:53

## 2019-10-17 RX ADMIN — HYDROMORPHONE HYDROCHLORIDE PRN MG: 1 INJECTION, SOLUTION INTRAMUSCULAR; INTRAVENOUS; SUBCUTANEOUS at 06:50

## 2019-10-17 RX ADMIN — QUETIAPINE SCH MG: 25 TABLET, FILM COATED ORAL at 20:58

## 2019-10-17 RX ADMIN — HYDROMORPHONE HYDROCHLORIDE PRN MG: 1 INJECTION, SOLUTION INTRAMUSCULAR; INTRAVENOUS; SUBCUTANEOUS at 15:03

## 2019-10-17 RX ADMIN — SODIUM CHLORIDE SCH MLS/HR: 9 INJECTION, SOLUTION INTRAVENOUS at 18:25

## 2019-10-17 RX ADMIN — HYDROMORPHONE HYDROCHLORIDE PRN MG: 1 INJECTION, SOLUTION INTRAMUSCULAR; INTRAVENOUS; SUBCUTANEOUS at 23:34

## 2019-10-17 RX ADMIN — ZOLPIDEM TARTRATE SCH MG: 10 TABLET, FILM COATED ORAL at 20:57

## 2019-10-17 RX ADMIN — POLYETHYLENE GLYCOL 3350 SCH GM: 17 POWDER, FOR SOLUTION ORAL at 07:58

## 2019-10-17 RX ADMIN — ATORVASTATIN CALCIUM SCH MG: 10 TABLET, FILM COATED ORAL at 20:58

## 2019-10-17 RX ADMIN — DULOXETINE HYDROCHLORIDE SCH MG: 30 CAPSULE, DELAYED RELEASE ORAL at 17:04

## 2019-10-17 RX ADMIN — DULOXETINE HYDROCHLORIDE SCH MG: 30 CAPSULE, DELAYED RELEASE ORAL at 07:58

## 2019-10-17 NOTE — NUR
Nutrition Intervention Note



RD Recommendation(s) for Physician: 

- Continue current diet

- Recommend Vitor 1 packet BID to promote wound healing

- Recommend MVI with mineral once daily to promote wound healing 



Plan of Care: RD following, monitoring for tolerance and adequacy. ONS and MVI rec's 



Nutrition reason for involvement:

LOS



RD Assessment

10/17: 44 YOM admitted for abscess to L BKA stump, pt seen today for LOS. Pt reports good 
appetite po intake. Pt reports UBW of 205-210lb with intentional wt loss with diet 
modifications, states wife was dx with DM and he follows her diet at home. Pt  s/p stump 
revision yesterday. Pt receptive to Vitor, states he has had it previously. No questions or 
concerns at this time. Will monitor and continue to follow. 



Principal Problems/Diagnoses: L BKA abscess



PMH: TBI with craniotomy x 4, PTSD, gastric ulcer with cauterization, L BKA 2/2 PAD



GI: LBM 10/17



Skin: L BKA site- abscess and cellulitis 



Labs: 10/16: AST 49, ALT 70, BMP WNL 



Meds: dilaudid, miralax, protonix, seroquel, lipitor, zofran 



Ht: 75 in 

Wt: 202 lb 

BMI: 25.3 

AIBW:182 lb 



Malnutrition Evaluation (10/17/19)

The patient does not meet criteria for a specified degree of malnutrition at this time. Will 
re-evaluate at follow-up as appropriate. 





Nutrition Prescription (Diet Order): Regular diet 



Estimated Nutritional Needs:

1737-2656 calories/day (22-27 kcal/kg CBW)

 g protein/day (1-1.5 g pro/kg CBW)





Diet Adequacy:

Meeting calorie needs, Meeting protein needs





Diet Education Needs Assessment:

Diet education not indicated; patient on regular diet. 





Nutrition Care Level: Low 



Nutrition Diagnosis: Increased nutrient needs (protein, MVI/min) in relation to skin 
integrity as evidenced by s/p revision of L BKA stump 2/2 abscess. 





Goal: Patient will meet % of estimated needs by follow up 

Progress: N/A



Interventions:

General healthful diet, Commercial beverage, Commercial food, Multivitamin/mineral 
supplement therapy, Collaboration with other providers



Monitoring/Evaluation:

Total energy intake, Total protein intake, Modified diet, Liquid supplement





Signed: Freda Bruno RD, LD, [facility]

## 2019-10-17 NOTE — NUR
Dr. MARGARETH Hyde paged regarding orders for Vancomycin  trough in the  morning (10/18)  as 
last Vancomycin trough was drawn on 10/14 with a value of 11.2.  A message was left on his 
voice mail.  Awaiting call back.

## 2019-10-17 NOTE — NUR
Spoke with ID doctor when he was rounding and asked if he wanted to order vanco trough. 
Physician stated no vanco trough needed at this time because he just had one.

## 2019-10-18 VITALS — SYSTOLIC BLOOD PRESSURE: 106 MMHG | DIASTOLIC BLOOD PRESSURE: 51 MMHG

## 2019-10-18 VITALS — SYSTOLIC BLOOD PRESSURE: 121 MMHG | DIASTOLIC BLOOD PRESSURE: 61 MMHG

## 2019-10-18 VITALS — SYSTOLIC BLOOD PRESSURE: 148 MMHG | DIASTOLIC BLOOD PRESSURE: 88 MMHG

## 2019-10-18 VITALS — SYSTOLIC BLOOD PRESSURE: 148 MMHG | DIASTOLIC BLOOD PRESSURE: 89 MMHG

## 2019-10-18 VITALS — DIASTOLIC BLOOD PRESSURE: 78 MMHG | SYSTOLIC BLOOD PRESSURE: 137 MMHG

## 2019-10-18 VITALS — SYSTOLIC BLOOD PRESSURE: 158 MMHG | DIASTOLIC BLOOD PRESSURE: 90 MMHG

## 2019-10-18 VITALS — SYSTOLIC BLOOD PRESSURE: 107 MMHG | DIASTOLIC BLOOD PRESSURE: 53 MMHG

## 2019-10-18 LAB
ALBUMIN SERPL-MCNC: 2.6 G/DL (ref 3.5–5)
ALBUMIN/GLOB SERPL: 0.9 {RATIO} (ref 0.8–2)
ALP SERPL-CCNC: 63 IU/L (ref 40–150)
ALT SERPL-CCNC: 46 IU/L (ref 0–55)
ANION GAP SERPL CALC-SCNC: 11.8 MMOL/L (ref 8–16)
BASOPHILS # BLD AUTO: 0 10*3/UL (ref 0–0.1)
BASOPHILS NFR BLD AUTO: 0.5 % (ref 0–1)
BUN SERPL-MCNC: 8 MG/DL (ref 7–26)
BUN/CREAT SERPL: 9 (ref 6–25)
CALCIUM SERPL-MCNC: 9.1 MG/DL (ref 8.4–10.2)
CHLORIDE SERPL-SCNC: 106 MMOL/L (ref 98–107)
CO2 SERPL-SCNC: 25 MMOL/L (ref 22–29)
DEPRECATED NEUTROPHILS # BLD AUTO: 3.1 10*3/UL (ref 2.1–6.9)
EGFRCR SERPLBLD CKD-EPI 2021: > 60 ML/MIN (ref 60–?)
EOSINOPHIL # BLD AUTO: 0.1 10*3/UL (ref 0–0.4)
EOSINOPHIL NFR BLD AUTO: 2 % (ref 0–6)
ERYTHROCYTE [DISTWIDTH] IN CORD BLOOD: 14.4 % (ref 11.7–14.4)
GLOBULIN PLAS-MCNC: 3 G/DL (ref 2.3–3.5)
GLUCOSE SERPLBLD-MCNC: 108 MG/DL (ref 74–118)
HCT VFR BLD AUTO: 35.8 % (ref 38.2–49.6)
HGB BLD-MCNC: 11.6 G/DL (ref 14–18)
LYMPHOCYTES # BLD: 2.4 10*3/UL (ref 1–3.2)
LYMPHOCYTES NFR BLD AUTO: 36.9 % (ref 18–39.1)
MCH RBC QN AUTO: 31.5 PG (ref 28–32)
MCHC RBC AUTO-ENTMCNC: 32.4 G/DL (ref 31–35)
MCV RBC AUTO: 97.3 FL (ref 81–99)
MONOCYTES # BLD AUTO: 0.7 10*3/UL (ref 0.2–0.8)
MONOCYTES NFR BLD AUTO: 11.6 % (ref 4.4–11.3)
NEUTS SEG NFR BLD AUTO: 48.4 % (ref 38.7–80)
PLATELET # BLD AUTO: 278 X10E3/UL (ref 140–360)
POTASSIUM SERPL-SCNC: 3.8 MMOL/L (ref 3.5–5.1)
RBC # BLD AUTO: 3.68 X10E6/UL (ref 4.3–5.7)
SODIUM SERPL-SCNC: 139 MMOL/L (ref 136–145)

## 2019-10-18 RX ADMIN — HYDROCODONE BITARTRATE AND ACETAMINOPHEN PRN EA: 7.5; 325 TABLET ORAL at 03:18

## 2019-10-18 RX ADMIN — HYDROMORPHONE HYDROCHLORIDE PRN MG: 1 INJECTION, SOLUTION INTRAMUSCULAR; INTRAVENOUS; SUBCUTANEOUS at 12:00

## 2019-10-18 RX ADMIN — SODIUM CHLORIDE SCH MLS/HR: 9 INJECTION, SOLUTION INTRAVENOUS at 06:00

## 2019-10-18 RX ADMIN — PANTOPRAZOLE SODIUM SCH MG: 40 TABLET, DELAYED RELEASE ORAL at 08:15

## 2019-10-18 RX ADMIN — HYDROCODONE BITARTRATE AND ACETAMINOPHEN PRN EA: 7.5; 325 TABLET ORAL at 14:32

## 2019-10-18 RX ADMIN — DULOXETINE HYDROCHLORIDE SCH MG: 30 CAPSULE, DELAYED RELEASE ORAL at 17:19

## 2019-10-18 RX ADMIN — ATORVASTATIN CALCIUM SCH MG: 10 TABLET, FILM COATED ORAL at 20:27

## 2019-10-18 RX ADMIN — HYDROMORPHONE HYDROCHLORIDE PRN MG: 1 INJECTION, SOLUTION INTRAMUSCULAR; INTRAVENOUS; SUBCUTANEOUS at 16:10

## 2019-10-18 RX ADMIN — HYDROCODONE BITARTRATE AND ACETAMINOPHEN PRN EA: 7.5; 325 TABLET ORAL at 22:22

## 2019-10-18 RX ADMIN — POLYETHYLENE GLYCOL 3350 SCH GM: 17 POWDER, FOR SOLUTION ORAL at 08:15

## 2019-10-18 RX ADMIN — HYDROMORPHONE HYDROCHLORIDE PRN MG: 1 INJECTION, SOLUTION INTRAMUSCULAR; INTRAVENOUS; SUBCUTANEOUS at 20:27

## 2019-10-18 RX ADMIN — CEFAZOLIN SODIUM SCH MLS/HR: 1 SOLUTION INTRAVENOUS at 14:30

## 2019-10-18 RX ADMIN — HYDROCODONE BITARTRATE AND ACETAMINOPHEN PRN EA: 7.5; 325 TABLET ORAL at 09:12

## 2019-10-18 RX ADMIN — ZOLPIDEM TARTRATE SCH MG: 10 TABLET, FILM COATED ORAL at 20:27

## 2019-10-18 RX ADMIN — CEFAZOLIN SODIUM SCH MLS/HR: 1 SOLUTION INTRAVENOUS at 22:00

## 2019-10-18 RX ADMIN — DULOXETINE HYDROCHLORIDE SCH MG: 30 CAPSULE, DELAYED RELEASE ORAL at 08:15

## 2019-10-18 RX ADMIN — QUETIAPINE SCH MG: 25 TABLET, FILM COATED ORAL at 20:27

## 2019-10-19 VITALS — DIASTOLIC BLOOD PRESSURE: 88 MMHG | SYSTOLIC BLOOD PRESSURE: 133 MMHG

## 2019-10-19 VITALS — SYSTOLIC BLOOD PRESSURE: 168 MMHG | DIASTOLIC BLOOD PRESSURE: 92 MMHG

## 2019-10-19 VITALS — SYSTOLIC BLOOD PRESSURE: 149 MMHG | DIASTOLIC BLOOD PRESSURE: 81 MMHG

## 2019-10-19 VITALS — SYSTOLIC BLOOD PRESSURE: 139 MMHG | DIASTOLIC BLOOD PRESSURE: 72 MMHG

## 2019-10-19 VITALS — SYSTOLIC BLOOD PRESSURE: 142 MMHG | DIASTOLIC BLOOD PRESSURE: 74 MMHG

## 2019-10-19 VITALS — SYSTOLIC BLOOD PRESSURE: 140 MMHG | DIASTOLIC BLOOD PRESSURE: 88 MMHG

## 2019-10-19 VITALS — DIASTOLIC BLOOD PRESSURE: 72 MMHG | SYSTOLIC BLOOD PRESSURE: 139 MMHG

## 2019-10-19 VITALS — DIASTOLIC BLOOD PRESSURE: 81 MMHG | SYSTOLIC BLOOD PRESSURE: 149 MMHG

## 2019-10-19 RX ADMIN — DULOXETINE HYDROCHLORIDE SCH MG: 30 CAPSULE, DELAYED RELEASE ORAL at 08:31

## 2019-10-19 RX ADMIN — HYDROCODONE BITARTRATE AND ACETAMINOPHEN PRN EA: 7.5; 325 TABLET ORAL at 11:20

## 2019-10-19 RX ADMIN — HYDROCODONE BITARTRATE AND ACETAMINOPHEN PRN EA: 7.5; 325 TABLET ORAL at 02:30

## 2019-10-19 RX ADMIN — CEFAZOLIN SODIUM SCH MLS/HR: 1 SOLUTION INTRAVENOUS at 14:12

## 2019-10-19 RX ADMIN — HYDROMORPHONE HYDROCHLORIDE PRN MG: 1 INJECTION, SOLUTION INTRAMUSCULAR; INTRAVENOUS; SUBCUTANEOUS at 14:11

## 2019-10-19 RX ADMIN — CEFAZOLIN SODIUM SCH MLS/HR: 1 SOLUTION INTRAVENOUS at 05:45

## 2019-10-19 RX ADMIN — HYDROMORPHONE HYDROCHLORIDE PRN MG: 1 INJECTION, SOLUTION INTRAMUSCULAR; INTRAVENOUS; SUBCUTANEOUS at 00:31

## 2019-10-19 RX ADMIN — HYDROMORPHONE HYDROCHLORIDE PRN MG: 1 INJECTION, SOLUTION INTRAMUSCULAR; INTRAVENOUS; SUBCUTANEOUS at 09:01

## 2019-10-19 RX ADMIN — ZOLPIDEM TARTRATE SCH MG: 10 TABLET, FILM COATED ORAL at 20:24

## 2019-10-19 RX ADMIN — CEFAZOLIN SODIUM SCH MLS/HR: 1 SOLUTION INTRAVENOUS at 21:32

## 2019-10-19 RX ADMIN — HYDROCODONE BITARTRATE AND ACETAMINOPHEN PRN EA: 7.5; 325 TABLET ORAL at 21:48

## 2019-10-19 RX ADMIN — ATORVASTATIN CALCIUM SCH MG: 10 TABLET, FILM COATED ORAL at 20:24

## 2019-10-19 RX ADMIN — HYDROMORPHONE HYDROCHLORIDE PRN MG: 1 INJECTION, SOLUTION INTRAMUSCULAR; INTRAVENOUS; SUBCUTANEOUS at 04:49

## 2019-10-19 RX ADMIN — PANTOPRAZOLE SODIUM SCH MG: 40 TABLET, DELAYED RELEASE ORAL at 08:31

## 2019-10-19 RX ADMIN — DULOXETINE HYDROCHLORIDE SCH MG: 30 CAPSULE, DELAYED RELEASE ORAL at 17:53

## 2019-10-19 RX ADMIN — HYDROMORPHONE HYDROCHLORIDE PRN MG: 1 INJECTION, SOLUTION INTRAMUSCULAR; INTRAVENOUS; SUBCUTANEOUS at 18:53

## 2019-10-19 RX ADMIN — HYDROMORPHONE HYDROCHLORIDE PRN MG: 1 INJECTION, SOLUTION INTRAMUSCULAR; INTRAVENOUS; SUBCUTANEOUS at 23:00

## 2019-10-19 RX ADMIN — QUETIAPINE SCH MG: 25 TABLET, FILM COATED ORAL at 20:25

## 2019-10-19 RX ADMIN — POLYETHYLENE GLYCOL 3350 SCH GM: 17 POWDER, FOR SOLUTION ORAL at 08:31

## 2019-10-19 RX ADMIN — HYDROCODONE BITARTRATE AND ACETAMINOPHEN PRN EA: 7.5; 325 TABLET ORAL at 16:54

## 2019-10-20 VITALS — DIASTOLIC BLOOD PRESSURE: 65 MMHG | SYSTOLIC BLOOD PRESSURE: 125 MMHG

## 2019-10-20 VITALS — SYSTOLIC BLOOD PRESSURE: 140 MMHG | DIASTOLIC BLOOD PRESSURE: 72 MMHG

## 2019-10-20 VITALS — DIASTOLIC BLOOD PRESSURE: 83 MMHG | SYSTOLIC BLOOD PRESSURE: 136 MMHG

## 2019-10-20 VITALS — SYSTOLIC BLOOD PRESSURE: 118 MMHG | DIASTOLIC BLOOD PRESSURE: 58 MMHG

## 2019-10-20 VITALS — SYSTOLIC BLOOD PRESSURE: 125 MMHG | DIASTOLIC BLOOD PRESSURE: 65 MMHG

## 2019-10-20 VITALS — SYSTOLIC BLOOD PRESSURE: 118 MMHG | DIASTOLIC BLOOD PRESSURE: 74 MMHG

## 2019-10-20 VITALS — SYSTOLIC BLOOD PRESSURE: 136 MMHG | DIASTOLIC BLOOD PRESSURE: 83 MMHG

## 2019-10-20 VITALS — DIASTOLIC BLOOD PRESSURE: 78 MMHG | SYSTOLIC BLOOD PRESSURE: 151 MMHG

## 2019-10-20 RX ADMIN — PANTOPRAZOLE SODIUM SCH MG: 40 TABLET, DELAYED RELEASE ORAL at 08:07

## 2019-10-20 RX ADMIN — HYDROMORPHONE HYDROCHLORIDE PRN MG: 1 INJECTION, SOLUTION INTRAMUSCULAR; INTRAVENOUS; SUBCUTANEOUS at 21:49

## 2019-10-20 RX ADMIN — CEFAZOLIN SODIUM SCH MLS/HR: 1 SOLUTION INTRAVENOUS at 05:45

## 2019-10-20 RX ADMIN — POLYETHYLENE GLYCOL 3350 SCH GM: 17 POWDER, FOR SOLUTION ORAL at 08:07

## 2019-10-20 RX ADMIN — WARFARIN SODIUM SCH MG: 3 TABLET ORAL at 17:22

## 2019-10-20 RX ADMIN — DULOXETINE HYDROCHLORIDE SCH MG: 30 CAPSULE, DELAYED RELEASE ORAL at 17:22

## 2019-10-20 RX ADMIN — CEFAZOLIN SODIUM SCH MLS/HR: 1 SOLUTION INTRAVENOUS at 21:48

## 2019-10-20 RX ADMIN — HYDROMORPHONE HYDROCHLORIDE PRN MG: 1 INJECTION, SOLUTION INTRAMUSCULAR; INTRAVENOUS; SUBCUTANEOUS at 08:07

## 2019-10-20 RX ADMIN — HYDROCODONE BITARTRATE AND ACETAMINOPHEN PRN EA: 7.5; 325 TABLET ORAL at 06:10

## 2019-10-20 RX ADMIN — HYDROCODONE BITARTRATE AND ACETAMINOPHEN PRN EA: 7.5; 325 TABLET ORAL at 10:15

## 2019-10-20 RX ADMIN — HYDROMORPHONE HYDROCHLORIDE PRN MG: 1 INJECTION, SOLUTION INTRAMUSCULAR; INTRAVENOUS; SUBCUTANEOUS at 17:23

## 2019-10-20 RX ADMIN — ATORVASTATIN CALCIUM SCH MG: 10 TABLET, FILM COATED ORAL at 21:31

## 2019-10-20 RX ADMIN — HYDROMORPHONE HYDROCHLORIDE PRN MG: 1 INJECTION, SOLUTION INTRAMUSCULAR; INTRAVENOUS; SUBCUTANEOUS at 03:06

## 2019-10-20 RX ADMIN — CEFAZOLIN SODIUM SCH MLS/HR: 1 SOLUTION INTRAVENOUS at 14:04

## 2019-10-20 RX ADMIN — HYDROCODONE BITARTRATE AND ACETAMINOPHEN PRN EA: 7.5; 325 TABLET ORAL at 18:52

## 2019-10-20 RX ADMIN — DULOXETINE HYDROCHLORIDE SCH MG: 30 CAPSULE, DELAYED RELEASE ORAL at 08:07

## 2019-10-20 RX ADMIN — HYDROCODONE BITARTRATE AND ACETAMINOPHEN PRN EA: 7.5; 325 TABLET ORAL at 22:58

## 2019-10-20 RX ADMIN — QUETIAPINE SCH MG: 25 TABLET, FILM COATED ORAL at 21:31

## 2019-10-20 RX ADMIN — HYDROMORPHONE HYDROCHLORIDE PRN MG: 1 INJECTION, SOLUTION INTRAMUSCULAR; INTRAVENOUS; SUBCUTANEOUS at 12:56

## 2019-10-20 RX ADMIN — HYDROCODONE BITARTRATE AND ACETAMINOPHEN PRN EA: 7.5; 325 TABLET ORAL at 02:09

## 2019-10-20 RX ADMIN — ZOLPIDEM TARTRATE SCH MG: 10 TABLET, FILM COATED ORAL at 21:31

## 2019-10-20 RX ADMIN — HYDROCODONE BITARTRATE AND ACETAMINOPHEN PRN EA: 7.5; 325 TABLET ORAL at 14:15

## 2019-10-21 VITALS — DIASTOLIC BLOOD PRESSURE: 74 MMHG | SYSTOLIC BLOOD PRESSURE: 124 MMHG

## 2019-10-21 VITALS — DIASTOLIC BLOOD PRESSURE: 63 MMHG | SYSTOLIC BLOOD PRESSURE: 122 MMHG

## 2019-10-21 VITALS — DIASTOLIC BLOOD PRESSURE: 81 MMHG | SYSTOLIC BLOOD PRESSURE: 136 MMHG

## 2019-10-21 VITALS — SYSTOLIC BLOOD PRESSURE: 136 MMHG | DIASTOLIC BLOOD PRESSURE: 87 MMHG

## 2019-10-21 VITALS — DIASTOLIC BLOOD PRESSURE: 79 MMHG | SYSTOLIC BLOOD PRESSURE: 140 MMHG

## 2019-10-21 VITALS — SYSTOLIC BLOOD PRESSURE: 118 MMHG | DIASTOLIC BLOOD PRESSURE: 66 MMHG

## 2019-10-21 VITALS — SYSTOLIC BLOOD PRESSURE: 136 MMHG | DIASTOLIC BLOOD PRESSURE: 81 MMHG

## 2019-10-21 VITALS — SYSTOLIC BLOOD PRESSURE: 140 MMHG | DIASTOLIC BLOOD PRESSURE: 79 MMHG

## 2019-10-21 LAB
ALBUMIN SERPL-MCNC: 3.2 G/DL (ref 3.5–5)
ALBUMIN/GLOB SERPL: 0.9 {RATIO} (ref 0.8–2)
ALP SERPL-CCNC: 77 IU/L (ref 40–150)
ALT SERPL-CCNC: 28 IU/L (ref 0–55)
ANION GAP SERPL CALC-SCNC: 14.5 MMOL/L (ref 8–16)
BASOPHILS # BLD AUTO: 0 10*3/UL (ref 0–0.1)
BASOPHILS NFR BLD AUTO: 0.5 % (ref 0–1)
BUN SERPL-MCNC: 10 MG/DL (ref 7–26)
BUN/CREAT SERPL: 11 (ref 6–25)
CALCIUM SERPL-MCNC: 9.5 MG/DL (ref 8.4–10.2)
CHLORIDE SERPL-SCNC: 103 MMOL/L (ref 98–107)
CO2 SERPL-SCNC: 24 MMOL/L (ref 22–29)
DEPRECATED INR PLAS: 0.84
DEPRECATED NEUTROPHILS # BLD AUTO: 2.4 10*3/UL (ref 2.1–6.9)
EGFRCR SERPLBLD CKD-EPI 2021: > 60 ML/MIN (ref 60–?)
EOSINOPHIL # BLD AUTO: 0.2 10*3/UL (ref 0–0.4)
EOSINOPHIL NFR BLD AUTO: 2.8 % (ref 0–6)
ERYTHROCYTE [DISTWIDTH] IN CORD BLOOD: 14 % (ref 11.7–14.4)
GLOBULIN PLAS-MCNC: 3.6 G/DL (ref 2.3–3.5)
GLUCOSE SERPLBLD-MCNC: 98 MG/DL (ref 74–118)
HCT VFR BLD AUTO: 39.9 % (ref 38.2–49.6)
HGB BLD-MCNC: 13.1 G/DL (ref 14–18)
LYMPHOCYTES # BLD: 2.6 10*3/UL (ref 1–3.2)
LYMPHOCYTES NFR BLD AUTO: 43.7 % (ref 18–39.1)
MCH RBC QN AUTO: 31.3 PG (ref 28–32)
MCHC RBC AUTO-ENTMCNC: 32.8 G/DL (ref 31–35)
MCV RBC AUTO: 95.5 FL (ref 81–99)
MONOCYTES # BLD AUTO: 0.8 10*3/UL (ref 0.2–0.8)
MONOCYTES NFR BLD AUTO: 12.7 % (ref 4.4–11.3)
NEUTS SEG NFR BLD AUTO: 40.1 % (ref 38.7–80)
PLATELET # BLD AUTO: 333 X10E3/UL (ref 140–360)
POTASSIUM SERPL-SCNC: 3.5 MMOL/L (ref 3.5–5.1)
PROTHROMBIN TIME: 12 SECONDS (ref 11.9–14.5)
RBC # BLD AUTO: 4.18 X10E6/UL (ref 4.3–5.7)
SODIUM SERPL-SCNC: 138 MMOL/L (ref 136–145)
TSH SERPL DL<=0.005 MIU/L-ACNC: 3.21 UIU/ML (ref 0.35–4.94)

## 2019-10-21 RX ADMIN — DULOXETINE HYDROCHLORIDE SCH MG: 30 CAPSULE, DELAYED RELEASE ORAL at 08:52

## 2019-10-21 RX ADMIN — HYDROCODONE BITARTRATE AND ACETAMINOPHEN PRN EA: 7.5; 325 TABLET ORAL at 07:08

## 2019-10-21 RX ADMIN — CEFAZOLIN SODIUM SCH MLS/HR: 1 SOLUTION INTRAVENOUS at 22:02

## 2019-10-21 RX ADMIN — PANTOPRAZOLE SODIUM SCH MG: 40 TABLET, DELAYED RELEASE ORAL at 08:30

## 2019-10-21 RX ADMIN — HYDROMORPHONE HYDROCHLORIDE PRN MG: 1 INJECTION, SOLUTION INTRAMUSCULAR; INTRAVENOUS; SUBCUTANEOUS at 06:00

## 2019-10-21 RX ADMIN — WARFARIN SODIUM SCH MG: 3 TABLET ORAL at 16:18

## 2019-10-21 RX ADMIN — HYDROCODONE BITARTRATE AND ACETAMINOPHEN PRN EA: 7.5; 325 TABLET ORAL at 20:00

## 2019-10-21 RX ADMIN — HYDROCODONE BITARTRATE AND ACETAMINOPHEN PRN EA: 7.5; 325 TABLET ORAL at 03:00

## 2019-10-21 RX ADMIN — ZOLPIDEM TARTRATE SCH MG: 10 TABLET, FILM COATED ORAL at 20:01

## 2019-10-21 RX ADMIN — CEFAZOLIN SODIUM SCH MLS/HR: 1 SOLUTION INTRAVENOUS at 14:18

## 2019-10-21 RX ADMIN — ATORVASTATIN CALCIUM SCH MG: 10 TABLET, FILM COATED ORAL at 20:01

## 2019-10-21 RX ADMIN — QUETIAPINE SCH MG: 25 TABLET, FILM COATED ORAL at 20:01

## 2019-10-21 RX ADMIN — HYDROMORPHONE HYDROCHLORIDE PRN MG: 1 INJECTION, SOLUTION INTRAMUSCULAR; INTRAVENOUS; SUBCUTANEOUS at 14:18

## 2019-10-21 RX ADMIN — HYDROMORPHONE HYDROCHLORIDE PRN MG: 1 INJECTION, SOLUTION INTRAMUSCULAR; INTRAVENOUS; SUBCUTANEOUS at 10:07

## 2019-10-21 RX ADMIN — HYDROMORPHONE HYDROCHLORIDE PRN MG: 1 INJECTION, SOLUTION INTRAMUSCULAR; INTRAVENOUS; SUBCUTANEOUS at 22:03

## 2019-10-21 RX ADMIN — HYDROCODONE BITARTRATE AND ACETAMINOPHEN PRN EA: 7.5; 325 TABLET ORAL at 16:18

## 2019-10-21 RX ADMIN — HYDROMORPHONE HYDROCHLORIDE PRN MG: 1 INJECTION, SOLUTION INTRAMUSCULAR; INTRAVENOUS; SUBCUTANEOUS at 18:26

## 2019-10-21 RX ADMIN — HYDROCODONE BITARTRATE AND ACETAMINOPHEN PRN EA: 7.5; 325 TABLET ORAL at 23:38

## 2019-10-21 RX ADMIN — DULOXETINE HYDROCHLORIDE SCH MG: 30 CAPSULE, DELAYED RELEASE ORAL at 16:18

## 2019-10-21 RX ADMIN — CEFAZOLIN SODIUM SCH MLS/HR: 1 SOLUTION INTRAVENOUS at 05:57

## 2019-10-21 RX ADMIN — POLYETHYLENE GLYCOL 3350 SCH GM: 17 POWDER, FOR SOLUTION ORAL at 08:53

## 2019-10-21 RX ADMIN — HYDROCODONE BITARTRATE AND ACETAMINOPHEN PRN EA: 7.5; 325 TABLET ORAL at 11:59

## 2019-10-21 RX ADMIN — HYDROMORPHONE HYDROCHLORIDE PRN MG: 1 INJECTION, SOLUTION INTRAMUSCULAR; INTRAVENOUS; SUBCUTANEOUS at 02:02

## 2019-10-21 NOTE — NUR
Spoke to pt at bedside regarding outpatient wound care. He states he wants to go to [facility] outpatient wound clinic. Choice letter signed and placed in chart. Copy to pt. 

Outpatient wound clinic's business card given to pt. 



Informed him of Dr. Gracia's plan to discharge tomorrow. Pt is agreeable. States his wife 
will be able to provide transportation for discharge home.

Discussed IMM letter with pt. He verbalized understanding. Signed copy placed in chart. Copy 
to pt.

## 2019-10-21 NOTE — NUR
PT IS NON COMPLIANT WITH FALL PRECAUTIONS. EDUCATED PT AGAIN ON FALL PRECAUTIONS. PT 
VERBALIZED UNDERSTANDING. PT DENIES NEEDS AT THIS TIME.

## 2019-10-21 NOTE — NUR
Called and spoke to Frances at wound clinic. Informed of referral and anticipated discharge 
for tomorrow. 



Facesheet and order faxed. 



[address]

[place] [address]

P [phone]

F [phone]

## 2019-10-21 NOTE — NUR
BEDSIDE SHIFT REPORT RECEIVED FROM THE NIGHT SHIFT RN. EDUCATED PT ABOUT FALL PRECAUTIONS. 
SIDE RAILS X 2. CALL LIGHT WITH IN EASY REACH. INSTRUCTED PT TO USE CALL LIGHT FOR ALL 
NEEDS. PT VERBALIZED UNDERSTANDING.  PT DENIES NEEDS AT THIS TIME.

## 2019-10-21 NOTE — NUR
Spoke to Dr. Gracia regarding DC plan. He states to set up outpatient wound care. Plan to 
discharge home tomorrow on oral antibiotics.

## 2019-10-22 ENCOUNTER — HOSPITAL ENCOUNTER (INPATIENT)
Dept: HOSPITAL 88 - ER | Age: 44
LOS: 2 days | Discharge: HOME | DRG: 566 | End: 2019-10-24
Attending: INTERNAL MEDICINE | Admitting: INTERNAL MEDICINE
Payer: MEDICARE

## 2019-10-22 VITALS — BODY MASS INDEX: 23.8 KG/M2 | WEIGHT: 201.56 LBS | HEIGHT: 77 IN

## 2019-10-22 VITALS — SYSTOLIC BLOOD PRESSURE: 120 MMHG | DIASTOLIC BLOOD PRESSURE: 73 MMHG

## 2019-10-22 VITALS — DIASTOLIC BLOOD PRESSURE: 70 MMHG | SYSTOLIC BLOOD PRESSURE: 147 MMHG

## 2019-10-22 VITALS — SYSTOLIC BLOOD PRESSURE: 156 MMHG | DIASTOLIC BLOOD PRESSURE: 78 MMHG

## 2019-10-22 VITALS — DIASTOLIC BLOOD PRESSURE: 78 MMHG | SYSTOLIC BLOOD PRESSURE: 156 MMHG

## 2019-10-22 VITALS — DIASTOLIC BLOOD PRESSURE: 71 MMHG | SYSTOLIC BLOOD PRESSURE: 158 MMHG

## 2019-10-22 DIAGNOSIS — F43.10: ICD-10-CM

## 2019-10-22 DIAGNOSIS — Z82.49: ICD-10-CM

## 2019-10-22 DIAGNOSIS — Z86.718: ICD-10-CM

## 2019-10-22 DIAGNOSIS — T87.44: Primary | ICD-10-CM

## 2019-10-22 DIAGNOSIS — T87.89: ICD-10-CM

## 2019-10-22 DIAGNOSIS — I10: ICD-10-CM

## 2019-10-22 DIAGNOSIS — Z87.820: ICD-10-CM
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
## 2019-10-22 DIAGNOSIS — G40.909: ICD-10-CM
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
## 2019-10-22 DIAGNOSIS — Z83.3: ICD-10-CM

## 2019-10-22 DIAGNOSIS — S80.12XA: ICD-10-CM

## 2019-10-22 DIAGNOSIS — Z88.5: ICD-10-CM

## 2019-10-22 DIAGNOSIS — Z88.0: ICD-10-CM
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
## 2019-10-22 LAB
ALBUMIN SERPL-MCNC: 3.2 G/DL (ref 3.5–5)
ALBUMIN/GLOB SERPL: 0.9 {RATIO} (ref 0.8–2)
ALP SERPL-CCNC: 68 IU/L (ref 40–150)
ALT SERPL-CCNC: 26 IU/L (ref 0–55)
ANION GAP SERPL CALC-SCNC: 12.1 MMOL/L (ref 8–16)
ANION GAP SERPL CALC-SCNC: 14.2 MMOL/L (ref 8–16)
BASOPHILS # BLD AUTO: 0 10*3/UL (ref 0–0.1)
BASOPHILS NFR BLD AUTO: 0.3 % (ref 0–1)
BUN SERPL-MCNC: 11 MG/DL (ref 7–26)
BUN SERPL-MCNC: 18 MG/DL (ref 7–26)
BUN/CREAT SERPL: 11 (ref 6–25)
BUN/CREAT SERPL: 15 (ref 6–25)
CALCIUM SERPL-MCNC: 10 MG/DL (ref 8.4–10.2)
CALCIUM SERPL-MCNC: 9.5 MG/DL (ref 8.4–10.2)
CHLORIDE SERPL-SCNC: 104 MMOL/L (ref 98–107)
CHLORIDE SERPL-SCNC: 105 MMOL/L (ref 98–107)
CO2 SERPL-SCNC: 25 MMOL/L (ref 22–29)
CO2 SERPL-SCNC: 28 MMOL/L (ref 22–29)
DEPRECATED APTT PLAS QN: 27.7 SECONDS (ref 23.8–35.5)
DEPRECATED INR PLAS: 0.97
DEPRECATED NEUTROPHILS # BLD AUTO: 11.1 10*3/UL (ref 2.1–6.9)
EGFRCR SERPLBLD CKD-EPI 2021: > 60 ML/MIN (ref 60–?)
EGFRCR SERPLBLD CKD-EPI 2021: > 60 ML/MIN (ref 60–?)
EOSINOPHIL # BLD AUTO: 0 10*3/UL (ref 0–0.4)
EOSINOPHIL NFR BLD AUTO: 0.3 % (ref 0–6)
ERYTHROCYTE [DISTWIDTH] IN CORD BLOOD: 14.1 % (ref 11.7–14.4)
GLOBULIN PLAS-MCNC: 3.4 G/DL (ref 2.3–3.5)
GLUCOSE SERPLBLD-MCNC: 78 MG/DL (ref 74–118)
GLUCOSE SERPLBLD-MCNC: 94 MG/DL (ref 74–118)
HCT VFR BLD AUTO: 40.7 % (ref 38.2–49.6)
HGB BLD-MCNC: 13.6 G/DL (ref 14–18)
LYMPHOCYTES # BLD: 1.6 10*3/UL (ref 1–3.2)
LYMPHOCYTES NFR BLD AUTO: 11.5 % (ref 18–39.1)
MCH RBC QN AUTO: 31.8 PG (ref 28–32)
MCHC RBC AUTO-ENTMCNC: 33.4 G/DL (ref 31–35)
MCV RBC AUTO: 95.1 FL (ref 81–99)
MONOCYTES # BLD AUTO: 1.1 10*3/UL (ref 0.2–0.8)
MONOCYTES NFR BLD AUTO: 8.1 % (ref 4.4–11.3)
NEUTS SEG NFR BLD AUTO: 79.4 % (ref 38.7–80)
PLATELET # BLD AUTO: 353 X10E3/UL (ref 140–360)
POTASSIUM SERPL-SCNC: 4.1 MMOL/L (ref 3.5–5.1)
POTASSIUM SERPL-SCNC: 4.2 MMOL/L (ref 3.5–5.1)
PROTHROMBIN TIME: 13.4 SECONDS (ref 11.9–14.5)
RBC # BLD AUTO: 4.28 X10E6/UL (ref 4.3–5.7)
SODIUM SERPL-SCNC: 140 MMOL/L (ref 136–145)
SODIUM SERPL-SCNC: 140 MMOL/L (ref 136–145)

## 2019-10-22 PROCEDURE — 85025 COMPLETE CBC W/AUTO DIFF WBC: CPT

## 2019-10-22 PROCEDURE — 85730 THROMBOPLASTIN TIME PARTIAL: CPT

## 2019-10-22 PROCEDURE — 85610 PROTHROMBIN TIME: CPT

## 2019-10-22 PROCEDURE — 80048 BASIC METABOLIC PNL TOTAL CA: CPT

## 2019-10-22 PROCEDURE — 80053 COMPREHEN METABOLIC PANEL: CPT

## 2019-10-22 PROCEDURE — 99284 EMERGENCY DEPT VISIT MOD MDM: CPT

## 2019-10-22 PROCEDURE — 36415 COLL VENOUS BLD VENIPUNCTURE: CPT

## 2019-10-22 RX ADMIN — HYDROMORPHONE HYDROCHLORIDE PRN MG: 1 INJECTION, SOLUTION INTRAMUSCULAR; INTRAVENOUS; SUBCUTANEOUS at 05:49

## 2019-10-22 RX ADMIN — DULOXETINE HYDROCHLORIDE SCH MG: 30 CAPSULE, DELAYED RELEASE ORAL at 08:03

## 2019-10-22 RX ADMIN — HYDROCODONE BITARTRATE AND ACETAMINOPHEN PRN EA: 7.5; 325 TABLET ORAL at 12:14

## 2019-10-22 RX ADMIN — CEFAZOLIN SODIUM SCH MLS/HR: 1 SOLUTION INTRAVENOUS at 12:08

## 2019-10-22 RX ADMIN — HYDROCODONE BITARTRATE AND ACETAMINOPHEN PRN EA: 7.5; 325 TABLET ORAL at 07:33

## 2019-10-22 RX ADMIN — HYDROCODONE BITARTRATE AND ACETAMINOPHEN PRN EA: 7.5; 325 TABLET ORAL at 03:03

## 2019-10-22 RX ADMIN — HYDROMORPHONE HYDROCHLORIDE PRN MG: 1 INJECTION, SOLUTION INTRAMUSCULAR; INTRAVENOUS; SUBCUTANEOUS at 10:12

## 2019-10-22 RX ADMIN — CEFAZOLIN SODIUM SCH MLS/HR: 1 SOLUTION INTRAVENOUS at 05:49

## 2019-10-22 RX ADMIN — PANTOPRAZOLE SODIUM SCH MG: 40 TABLET, DELAYED RELEASE ORAL at 07:33

## 2019-10-22 RX ADMIN — HYDROMORPHONE HYDROCHLORIDE PRN MG: 1 INJECTION, SOLUTION INTRAMUSCULAR; INTRAVENOUS; SUBCUTANEOUS at 02:01

## 2019-10-22 RX ADMIN — POLYETHYLENE GLYCOL 3350 SCH GM: 17 POWDER, FOR SOLUTION ORAL at 07:33

## 2019-10-22 NOTE — NUR
PT DISCHARGED HOME SAFELY WITH FAMILY. PT ESCORTED VIA WHEEL CHAIR TO THE FRONT ENTRANCE.IV 
REMOVED. TIP INTACT. DRESSING APPLIED. RX GIVEN. WOUND CARE APPOINTMENT CONFIRMED. PT IS 
AWARE. PT VERBALIZED UNDERSTANDING. PT DENIED FURTHER NEEDS.

## 2019-10-22 NOTE — NUR
Changed dressing to left stump, removed packing, sero/sang drainage, no odor noted, deep 
area to lateral anterior stump, cleansed with batadime and NS and re-packed with betadime 
packing strips. ACE wrap applied and secured with Coban. will monitor. Discharge follow up post PCI: 10/17/2019Ost Cx to Prox Cx lesion and 1st Mrg lesion with side branch in Lat 1st Mrg.    What does the site look like?  Review: Care of wrist  It is normal to have soreness and or bruising at the puncture site and mild tingling in your hand for up to 3 days. The site should be flat and dry.   Wrist    For 2 days, do not use your hand or arm to support your weight (such as rising from a chair) or bend your wrist (such as lifting a garage door).         For 2 days, do not lift more than 5 pounds or exercise your arm (tennis, golfing or bowling).  reviewed      If you start bleeding from the site in your arm:  Sit down and press firmly on the site with your fingers for 10 minutes. Call your doctor as soon as you can.  If the bleeding stops, sit still and keep your wrist straight for 2 hours.  Do NOT take a bath, or use a hot tub or pool for at least 3 days. You may shower.    Reviewed    Call your doctor if:    You have a large or growing hard lump around the site.    The site is red, swollen, hot or tender.    Blood or fluid is draining from the site.    You have chills or a fever greater than 101 F (38 C).    Your leg or arm feels numb or cool.    You have hives, a rash or unusual itching.   reviewed  Are you having any pain? no    How is your activity tolerance?    For 2 days, do NOT have sex or do any heavy exercise.  Do you have someone at home to assist you with your daily activities?  Home with      Review Medications: Dual antiplatelet therapy  If you have started taking Brilinta do not stop taking it until you talk to your heart doctor (cardiologist). Note that INR is > 2.0, asked patient to stop taking her ASA  If you have stopped any other medicines, check with your nurse or provider about when to restart them.  New Medication: Patient was educated regarding newly prescribed medication, including discussion of  the indication, administration, side effects, and when to  report to MD or RN. Patient demonstrated understanding of this information and agreed to call with further questions or concerns.      Have you scheduled with Cardiac Rehab? Yes, would like to do it closer to home     FOLLOW UP  Do you have a follow up appointment with your provider? Saw Dr. Hilton yesterday    What other discharge instructions do you have?   none  Are you to get labs, procedures or tests before you see your provider? Labs prior to Dr. Moreno appointment      You are scheduled to see Dr. Moreno November 5         CONTACT INFORMATION  Please feel free to call us with any other questions or symptoms that are concerning for you at [phone] if it is after 4:30 in the afternoon, or a weekend please call [phone] and ask for the on call specialist.  We want to do everything we can to help prevent you needing to return to the ED, so please do not hesitate to call us.

## 2019-10-22 NOTE — DIAGNOSTIC IMAGING REPORT
X-ray left knee 2 views HISTORY:  Pain.    

COMPARISON: Left knee MRI 10/13/2019, CT left knee 10/8/2019, left knee

radiograph 10/6/2019

     

FINDINGS:



Bones:

Status post amputation of the proximal tibia and fibula.

Subtle lucency and irregularity of the distal fibula.



Joints:

The joint spaces are well-maintained.



Soft tissues:

Increased thickness and density of the soft tissues of the stump.

Surgical clips in the proximal calf soft tissues and distal thigh soft tissues,

where there is also a vascular stent.



IMPRESSION: 



Findings of the proximal calf stump are concerning for worsening soft tissue

infection and possibly osteomyelitis of the distal aspect of the proximal

fibula stump.



Signed by: Olayinka Patiño DO on 10/22/2019 11:17 PM

## 2019-10-22 NOTE — NUR
Spoke with Frances at wound clinic. States she called pt and gave him appointment information. 


Appt set for 10/24/2019 at 10:30am 



[address]

[place] [address]

P [phone]

F [phone]

## 2019-10-23 VITALS — DIASTOLIC BLOOD PRESSURE: 50 MMHG | SYSTOLIC BLOOD PRESSURE: 95 MMHG

## 2019-10-23 VITALS — SYSTOLIC BLOOD PRESSURE: 106 MMHG | DIASTOLIC BLOOD PRESSURE: 52 MMHG

## 2019-10-23 VITALS — SYSTOLIC BLOOD PRESSURE: 101 MMHG | DIASTOLIC BLOOD PRESSURE: 58 MMHG

## 2019-10-23 VITALS — SYSTOLIC BLOOD PRESSURE: 118 MMHG | DIASTOLIC BLOOD PRESSURE: 86 MMHG

## 2019-10-23 VITALS — SYSTOLIC BLOOD PRESSURE: 121 MMHG | DIASTOLIC BLOOD PRESSURE: 61 MMHG

## 2019-10-23 VITALS — SYSTOLIC BLOOD PRESSURE: 127 MMHG | DIASTOLIC BLOOD PRESSURE: 61 MMHG

## 2019-10-23 VITALS — DIASTOLIC BLOOD PRESSURE: 52 MMHG | SYSTOLIC BLOOD PRESSURE: 106 MMHG

## 2019-10-23 LAB
ALBUMIN SERPL-MCNC: 3 G/DL (ref 3.5–5)
ALBUMIN/GLOB SERPL: 1 {RATIO} (ref 0.8–2)
ALP SERPL-CCNC: 59 IU/L (ref 40–150)
ALT SERPL-CCNC: 18 IU/L (ref 0–55)
ANION GAP SERPL CALC-SCNC: 10.1 MMOL/L (ref 8–16)
BASOPHILS # BLD AUTO: 0 10*3/UL (ref 0–0.1)
BASOPHILS NFR BLD AUTO: 0.4 % (ref 0–1)
BUN SERPL-MCNC: 13 MG/DL (ref 7–26)
BUN/CREAT SERPL: 20 (ref 6–25)
CALCIUM SERPL-MCNC: 9.4 MG/DL (ref 8.4–10.2)
CHLORIDE SERPL-SCNC: 106 MMOL/L (ref 98–107)
CO2 SERPL-SCNC: 24 MMOL/L (ref 22–29)
DEPRECATED NEUTROPHILS # BLD AUTO: 4.2 10*3/UL (ref 2.1–6.9)
EGFRCR SERPLBLD CKD-EPI 2021: > 60 ML/MIN (ref 60–?)
EOSINOPHIL # BLD AUTO: 0.1 10*3/UL (ref 0–0.4)
EOSINOPHIL NFR BLD AUTO: 1.5 % (ref 0–6)
ERYTHROCYTE [DISTWIDTH] IN CORD BLOOD: 14.3 % (ref 11.7–14.4)
GLOBULIN PLAS-MCNC: 3 G/DL (ref 2.3–3.5)
GLUCOSE SERPLBLD-MCNC: 89 MG/DL (ref 74–118)
HCT VFR BLD AUTO: 36.7 % (ref 38.2–49.6)
HGB BLD-MCNC: 12.2 G/DL (ref 14–18)
LYMPHOCYTES # BLD: 2 10*3/UL (ref 1–3.2)
LYMPHOCYTES NFR BLD AUTO: 28 % (ref 18–39.1)
MCH RBC QN AUTO: 31.9 PG (ref 28–32)
MCHC RBC AUTO-ENTMCNC: 33.2 G/DL (ref 31–35)
MCV RBC AUTO: 95.8 FL (ref 81–99)
MONOCYTES # BLD AUTO: 0.8 10*3/UL (ref 0.2–0.8)
MONOCYTES NFR BLD AUTO: 11.4 % (ref 4.4–11.3)
NEUTS SEG NFR BLD AUTO: 58.4 % (ref 38.7–80)
PLATELET # BLD AUTO: 294 X10E3/UL (ref 140–360)
POTASSIUM SERPL-SCNC: 4.1 MMOL/L (ref 3.5–5.1)
RBC # BLD AUTO: 3.83 X10E6/UL (ref 4.3–5.7)
SODIUM SERPL-SCNC: 136 MMOL/L (ref 136–145)

## 2019-10-23 RX ADMIN — Medication PRN MG: at 03:37

## 2019-10-23 RX ADMIN — DULOXETINE HYDROCHLORIDE SCH MG: 30 CAPSULE, DELAYED RELEASE ORAL at 16:36

## 2019-10-23 RX ADMIN — PREGABALIN SCH MG: 75 CAPSULE ORAL at 09:19

## 2019-10-23 RX ADMIN — DULOXETINE HYDROCHLORIDE SCH MG: 30 CAPSULE, DELAYED RELEASE ORAL at 09:19

## 2019-10-23 RX ADMIN — HYDROCODONE BITARTRATE AND ACETAMINOPHEN SCH EA: 10; 325 TABLET ORAL at 18:00

## 2019-10-23 RX ADMIN — SODIUM CHLORIDE PRN MG: 900 INJECTION INTRAVENOUS at 07:40

## 2019-10-23 RX ADMIN — SODIUM CHLORIDE PRN MG: 900 INJECTION INTRAVENOUS at 03:37

## 2019-10-23 RX ADMIN — SODIUM CHLORIDE PRN MG: 900 INJECTION INTRAVENOUS at 15:45

## 2019-10-23 RX ADMIN — SODIUM CHLORIDE SCH MLS/HR: 9 INJECTION, SOLUTION INTRAVENOUS at 01:01

## 2019-10-23 RX ADMIN — HYDROCODONE BITARTRATE AND ACETAMINOPHEN SCH EA: 10; 325 TABLET ORAL at 09:20

## 2019-10-23 RX ADMIN — Medication PRN MG: at 07:40

## 2019-10-23 RX ADMIN — PANTOPRAZOLE SODIUM SCH MG: 40 TABLET, DELAYED RELEASE ORAL at 09:19

## 2019-10-23 RX ADMIN — HYDROCODONE BITARTRATE AND ACETAMINOPHEN SCH EA: 10; 325 TABLET ORAL at 13:23

## 2019-10-23 RX ADMIN — PREGABALIN SCH MG: 75 CAPSULE ORAL at 16:36

## 2019-10-23 RX ADMIN — SODIUM CHLORIDE PRN MG: 900 INJECTION INTRAVENOUS at 20:08

## 2019-10-23 RX ADMIN — SODIUM CHLORIDE PRN MG: 900 INJECTION INTRAVENOUS at 11:55

## 2019-10-23 RX ADMIN — Medication PRN MG: at 20:08

## 2019-10-23 RX ADMIN — Medication PRN MG: at 11:55

## 2019-10-23 RX ADMIN — HYDROCODONE BITARTRATE AND ACETAMINOPHEN SCH EA: 10; 325 TABLET ORAL at 21:09

## 2019-10-23 RX ADMIN — SODIUM CHLORIDE SCH MLS/HR: 9 INJECTION, SOLUTION INTRAVENOUS at 12:00

## 2019-10-23 RX ADMIN — CEFAZOLIN SODIUM SCH MLS/HR: 1 SOLUTION INTRAVENOUS at 14:46

## 2019-10-23 RX ADMIN — Medication PRN MG: at 15:45

## 2019-10-23 RX ADMIN — CEFAZOLIN SODIUM SCH MLS/HR: 1 SOLUTION INTRAVENOUS at 21:09

## 2019-10-23 NOTE — NUR
DOCTOR STATES SNF, SPOKE WITH PT AND GAVE CHOICE, HE SIGNED FOR [facility] AND 
WILL SEND CLINICALS WHEN PT EVALS.

## 2019-10-23 NOTE — NUR
COMPLETED RTF AND PASRR, SPOKE WITH FACILITY ABOUT THIS PT, NEED ORDER FROM MD AND THERE ARE 
CONSULTS, WHEN ALL ARE COMPLETE FACILITY WILL PROCEED WITH REFERRAL.

## 2019-10-23 NOTE — DISCHARGE SUMMARY
He is a 44-year-old male patient, admitted with a complaint of left leg severe pain,

redness, and swelling. 

 

ADMITTING IMPRESSION AND DIAGNOSES:  Recurrent cellulitis and abscess of the left leg

below-knee amputation stump area, history of posttraumatic stress disorder, peripheral

arterial disease, and history of deep venous thrombosis. 

 

HOSPITAL COURSE SUMMARY:  The patient was admitted with above diagnosis.  The patient

had IV antibiotics and ID and Surgical consult was done.  The patient was started on

cefepime, IV antibiotics, and vancomycin.  The patient had I and D done and wound was

cleaned.  The patient was doing better, but then subsequently the patient again having

severe pain, redness, and swelling.  So, the patient had developed another abscess.  The

patient was taken to the operation theater second time and the patient's abscess was

drained and revision of the below-knee amputation stump was done surgically by Dr. Parra.

The patient had a knee MRI was done and lower extremity CAT scan was done, and the

osteomyelitis probability was low.  So, the patient's antibiotic was changed and the

patient was given vancomycin and aztreonam.  Subsequently found on the cultures, the

patient has Staph infection, which was sensitive to cefazolin.  The patient was treated

with IV cefazolin and aggressive wound care was done and postop care was done and now

finally upon stabilization, the patient is being discharged home on 

oral Keflex and the patient will be needing outpatient wound care.  The patient has a

history of a DVT and was on warfarin by the Hematology for the need of anticoagulation. 

 

 

 

 

______________________________

MD CONCHIS Hernandez/JB

D:  10/22/2019 10:47:21

T:  10/23/2019 02:00:03

Job #:  [id number]

## 2019-10-23 NOTE — NUR
WOUND CARE NURSE INITIAL CONSULTATION. 



44 YEAR OLD MALE ADMITTED TO [facility] WITH DX CELLULITIS.

HEAD TO TOE SKIN ASSESSMENT PERFORMED TODAY. 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 
OCTOBER 16 2019 BY DR. SMILEY. PT STATES THAT HE HAD A FALL AND HIT THE INCISION LINE AT  HOME 
YESTERDAY, STATES HE HAD SEVERE PAIN, THEREFORE HE DECIDED TO GO TO THE ER. SUTURES ARE 
INTACT. SMALL AMOUNT OF SEROUS DRAINAGE IS PRESENT WITH NO REDNESS NOTED  AT THIS TIME. 
RATES PAIN OF 4 OUT OF 10. THERE ARE NO OTHER AREAS OF CONCERN NOTED AT THIS TIME. PT ON IV 
ABX. HE WAS EDUCATED ON PLAN OF CARE. STATES UNDERSTANDING. 



LABS: 



WBC: 7.11

ALB: 3.0

LEFT BKA WOUND CX IS POSITIVE FOR STAPHYLOCOCCUS FAECALIS



RECOMMENDATIONS: 



ENCOURAGE PT TO REPOSITION EVERY TWO HOURS AND PRN. 

ELEVATE LEFT BKA WITH PILLOWS.  

CLEAN INCISION LINE WITH NS, APPLY BACTROBAN OINTMENT AND COVER WITH 4X4 GAUZE, KERLIX AND 
TAPE. CHANGE DRESSING DAILY.



THANKS FOR THIS CONSULTATION. 

-------------------------------------------------------------------------------

Addendum: 10/23/19 at 1422 by Debi Nesbitt RN

-------------------------------------------------------------------------------

Amended: Links added.
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
## 2019-10-23 NOTE — CONSULTATION
DATE OF CONSULTATION:  10/23/2019

 

ID Consult 

 

REASON FOR CONSULTATION:  Cellulitis. 

 

Thank you, Dr. Gracia, for asking me to see this patient, who was admitted through the

emergency department. 

 

HISTORY OF PRESENT ILLNESS:  The patient is a 44-year-old man, who was referred for

cellulitis.  He presented to the emergency department several hours after discharge

following a fall at home.  The patient fell at home while trying to get up from his new

swivel chair.  The chair swiveled while he was getting up and his hands slipped and he

fell, landing on the left below-the-knee amputation stump.  The stump incision bled, but

did not break and he has been complaining of pain. 

 

PAST MEDICAL HISTORY:  Hypertension, hyperlipidemia, deep venous thrombosis or pulmonary

embolism, seizure disorder, peptic ulcer disease, anxiety and depression as well. 

 

PAST SURGICAL HISTORY:  Skull fracture, right shoulder rotator cuff repair, left

superficial femoral thrombectomy, right knee surgery for ACL repair, recent failed I and

D of left BKA stump abscess and recent revision of left BKA. 

 

ALLERGIES:  PENICILLIN AND CODEINE.

 

MEDICATIONS:  See MAR.

 

IMMUNIZATION:  The patient received pneumococcal vaccination in the past and influenza

vaccine on 10/09/2019. 

 

FAMILY HISTORY:  Noncontributory.

 

SOCIAL HISTORY:  He denies alcohol, tobacco, or recreational drug use.

 

REVIEW OF SYSTEMS:

As per history of present illness.

 

PHYSICAL EXAMINATION:

GENERAL:  No acute distress and does not appear toxic. 

VITAL SIGNS:  T-max 97.9, pulse rate 66, respiratory rate 18, blood pressure 101/58, and

weight 200 pounds. 

HEENT:  Normocephalic and atraumatic.  There is no icterus or injection of conjunctiva.

There is no ear or nasal discharge.  Moist oral mucosa.  No pharyngeal erythema or

exudate. 

NECK:  Supple.  No meningismus. 

LUNGS:  Clear to auscultation bilaterally. 

HEART:  With normal S1 and S2.  Regular. 

ABDOMEN:  Soft and nontender. 

EXTREMITIES:  The left BKA stump incision is clean and there is no redness of the stump.

 There is mild edema and serous discharge from the lateral segment.  There is no edema,

clubbing, or cyanosis of the rest of the extremities. 

SKIN:  No acute erythema. 

CNS:  Awake, alert, and oriented to person, place, and time.  Nonfocal.

LABORATORY AND DIAGNOSTIC DATA:  WBC 7,100, down from 13,970 yesterday, hemoglobin 12.2,

platelet 294,000, neutrophils 58.4, lymphocytes 28, monocytes 11.4, eosinophils 1.5, and

basophils 0.4.  BUN 13 and creatinine 0.65. 

 

IMPRESSION:  

1. Left below-the-knee amputation stump contusion, status post fall at home.

2. Recent left below-the-knee amputation stump revision for failed incision and drainage

of a stump abscess due to methicillin-sensitive Staphylococcus aureus. 

3. Deep venous thrombosis by history.

4. Seizure disorder.

 

PLAN:  

1. Discharge planning to nursing home resident as per Internal Medicine.

2. The patient may continue cefazolin 1 g IV piggyback q.8 hours until discharge.

 

 

 

 

______________________________

MD FLOR Kramer/JB

D:  10/23/2019 12:44:04

T:  10/23/2019 18:18:24

Job #:  [id number]

## 2019-10-24 VITALS — SYSTOLIC BLOOD PRESSURE: 144 MMHG | DIASTOLIC BLOOD PRESSURE: 67 MMHG

## 2019-10-24 VITALS — DIASTOLIC BLOOD PRESSURE: 54 MMHG | SYSTOLIC BLOOD PRESSURE: 96 MMHG

## 2019-10-24 VITALS — DIASTOLIC BLOOD PRESSURE: 60 MMHG | SYSTOLIC BLOOD PRESSURE: 125 MMHG

## 2019-10-24 VITALS — DIASTOLIC BLOOD PRESSURE: 67 MMHG | SYSTOLIC BLOOD PRESSURE: 144 MMHG

## 2019-10-24 VITALS — SYSTOLIC BLOOD PRESSURE: 129 MMHG

## 2019-10-24 LAB
ALBUMIN SERPL-MCNC: 3 G/DL (ref 3.5–5)
ALBUMIN/GLOB SERPL: 1 {RATIO} (ref 0.8–2)
ALP SERPL-CCNC: 63 IU/L (ref 40–150)
ALT SERPL-CCNC: 22 IU/L (ref 0–55)
ANION GAP SERPL CALC-SCNC: 13.1 MMOL/L (ref 8–16)
BASOPHILS # BLD AUTO: 0 10*3/UL (ref 0–0.1)
BASOPHILS NFR BLD AUTO: 0.4 % (ref 0–1)
BUN SERPL-MCNC: 10 MG/DL (ref 7–26)
BUN/CREAT SERPL: 11 (ref 6–25)
CALCIUM SERPL-MCNC: 9.3 MG/DL (ref 8.4–10.2)
CHLORIDE SERPL-SCNC: 107 MMOL/L (ref 98–107)
CO2 SERPL-SCNC: 23 MMOL/L (ref 22–29)
DEPRECATED NEUTROPHILS # BLD AUTO: 4.6 10*3/UL (ref 2.1–6.9)
EGFRCR SERPLBLD CKD-EPI 2021: > 60 ML/MIN (ref 60–?)
EOSINOPHIL # BLD AUTO: 0.2 10*3/UL (ref 0–0.4)
EOSINOPHIL NFR BLD AUTO: 2.2 % (ref 0–6)
ERYTHROCYTE [DISTWIDTH] IN CORD BLOOD: 14 % (ref 11.7–14.4)
GLOBULIN PLAS-MCNC: 3 G/DL (ref 2.3–3.5)
GLUCOSE SERPLBLD-MCNC: 103 MG/DL (ref 74–118)
HCT VFR BLD AUTO: 35.1 % (ref 38.2–49.6)
HGB BLD-MCNC: 11.5 G/DL (ref 14–18)
LYMPHOCYTES # BLD: 2.8 10*3/UL (ref 1–3.2)
LYMPHOCYTES NFR BLD AUTO: 33.4 % (ref 18–39.1)
MCH RBC QN AUTO: 31.4 PG (ref 28–32)
MCHC RBC AUTO-ENTMCNC: 32.8 G/DL (ref 31–35)
MCV RBC AUTO: 95.9 FL (ref 81–99)
MONOCYTES # BLD AUTO: 0.8 10*3/UL (ref 0.2–0.8)
MONOCYTES NFR BLD AUTO: 9.8 % (ref 4.4–11.3)
NEUTS SEG NFR BLD AUTO: 53.8 % (ref 38.7–80)
PLATELET # BLD AUTO: 298 X10E3/UL (ref 140–360)
POTASSIUM SERPL-SCNC: 4.1 MMOL/L (ref 3.5–5.1)
RBC # BLD AUTO: 3.66 X10E6/UL (ref 4.3–5.7)
SODIUM SERPL-SCNC: 139 MMOL/L (ref 136–145)

## 2019-10-24 RX ADMIN — CEFAZOLIN SODIUM SCH MLS/HR: 1 SOLUTION INTRAVENOUS at 14:10

## 2019-10-24 RX ADMIN — Medication PRN MG: at 00:00

## 2019-10-24 RX ADMIN — CEFAZOLIN SODIUM SCH MLS/HR: 1 SOLUTION INTRAVENOUS at 06:30

## 2019-10-24 RX ADMIN — Medication PRN MG: at 04:12

## 2019-10-24 RX ADMIN — SODIUM CHLORIDE SCH MLS/HR: 9 INJECTION, SOLUTION INTRAVENOUS at 07:04

## 2019-10-24 RX ADMIN — SODIUM CHLORIDE PRN MG: 900 INJECTION INTRAVENOUS at 04:12

## 2019-10-24 RX ADMIN — PANTOPRAZOLE SODIUM SCH MG: 40 TABLET, DELAYED RELEASE ORAL at 08:05

## 2019-10-24 RX ADMIN — SODIUM CHLORIDE PRN MG: 900 INJECTION INTRAVENOUS at 08:05

## 2019-10-24 RX ADMIN — DULOXETINE HYDROCHLORIDE SCH MG: 30 CAPSULE, DELAYED RELEASE ORAL at 08:05

## 2019-10-24 RX ADMIN — PREGABALIN SCH MG: 75 CAPSULE ORAL at 08:05

## 2019-10-24 RX ADMIN — SODIUM CHLORIDE PRN MG: 900 INJECTION INTRAVENOUS at 00:00

## 2019-10-24 RX ADMIN — HYDROCODONE BITARTRATE AND ACETAMINOPHEN SCH EA: 10; 325 TABLET ORAL at 10:05

## 2019-10-24 RX ADMIN — Medication PRN MG: at 08:05

## 2019-10-24 RX ADMIN — HYDROCODONE BITARTRATE AND ACETAMINOPHEN SCH EA: 10; 325 TABLET ORAL at 14:10

## 2019-10-24 NOTE — NUR
patient alert and oriented. discharge instructions given at this time, patient verbalized 
understanding. IV discontinued, catheter in tact and small dressing applied. patient to be 
wheeled out to personal auto for mother to drive home.

## 2019-10-24 NOTE — NUR
CALL PLACED TO DR. YARIEL ALDRICH REQUESTING OBSERVATION ORDER.PT IS BEING DISCHARGED W/ HOME 
HEALTH TO FOLLOW

## 2019-10-25 NOTE — DISCHARGE SUMMARY
Jake Holder is a 44-year-old male patient, was admitted with a fall at home.

 

ADMITTING IMPRESSION AND DIAGNOSES:  Left below-knee amputation stump cellulitis with

fall and recent injury, for which the patient has recently revised his below-knee

amputation stump for the cellulitis and recurrent abscess; posttraumatic stress

disorder; history of coronary artery disease; and deep venous thromboses. 

 

HOSPITAL COURSE SUMMARY:  The patient was kept under observation and given antibiotics

and given IV fluids, and the patient had an evaluation by ID and Surgery.  Upon

stabilization, the patient will be discharged home and follow up as an outpatient with

oral antibiotics, Keflex.  The patient had physical therapy and occupational therapy

evaluation was done.  The patient had also skilled nursing evaluation was done because

of the recurrent hospitalizations and fall.  The patient is not on any IV antibiotics at

this time.  So, the patient did 

not qualify as per insurance criteria with home health.  So, the patient will be

discharged home with home physical therapy and occupational therapy evaluation and fall

prevention. 

 

 

 

 

______________________________

MD CONCHIS Hernandez/JB

D:  10/24/2019 10:02:59

T:  10/25/2019 07:26:14

Job #:  [id number]

## 2019-10-26 ENCOUNTER — HOSPITAL ENCOUNTER (EMERGENCY)
Dept: HOSPITAL 88 - ER | Age: 44
Discharge: HOME | End: 2019-10-26
Payer: MEDICARE

## 2019-10-26 VITALS — HEIGHT: 77 IN | BODY MASS INDEX: 23.73 KG/M2 | WEIGHT: 201 LBS
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
## 2019-10-26 DIAGNOSIS — I10: ICD-10-CM

## 2019-10-26 DIAGNOSIS — Z89.512: ICD-10-CM

## 2019-10-26 DIAGNOSIS — M25.511: Primary | ICD-10-CM
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
## 2019-10-26 DIAGNOSIS — K21.9: ICD-10-CM

## 2019-10-26 DIAGNOSIS — Z79.01: ICD-10-CM

## 2019-10-26 DIAGNOSIS — Z86.718: ICD-10-CM

## 2019-10-26 LAB
DEPRECATED INR PLAS: 0.88
PROTHROMBIN TIME: 12.4 SECONDS (ref 11.9–14.5)

## 2019-10-26 PROCEDURE — 85610 PROTHROMBIN TIME: CPT

## 2019-10-26 PROCEDURE — 36415 COLL VENOUS BLD VENIPUNCTURE: CPT

## 2019-10-26 PROCEDURE — 99283 EMERGENCY DEPT VISIT LOW MDM: CPT

## 2019-10-26 NOTE — DIAGNOSTIC IMAGING REPORT
******** ADDENDUM #1 ********



Comparison is made to right shoulder radiograph on 1/31/2019.

The shoulder findings have progressed relative to 1/31/2019.



Signed by: Olayinka Patiño DO on 10/26/2019 3:21 AM

******** ORIGINAL REPORT ********



X-ray right shoulder 3 views



HISTORY:  Pain.    

COMPARISON: None available.

     

FINDINGS:



No acute fracture.



Evidence of chronic rotator cuff tear with high writing humeral head abutting

the undersurface of the acromion, with acetabularization of the acromion, and

femoralization of the humeral head/greater trochanter. Osteophytes in the

glenohumeral joint.



No gross soft tissue abnormality.



IMPRESSION: 



No acute osseous abnormality.

Chronic sequela of rotator cuff tear with advanced degenerative changes in the

shoulder as described above.



Signed by: Olayinka Patiño DO on 10/26/2019 3:15 AM

## 2019-10-26 NOTE — DIAGNOSTIC IMAGING REPORT
X-ray left knee 3 views 



HISTORY:  Pain.    

COMPARISON: Left knee radiographs 10/22/2019

     

FINDINGS:



Bones:

No acute fracture.

Status post amputation of the proximal tibia and fibula.

Subtle lucency and irregularity of the distal fibula.



Joints:

The joint spaces are well-maintained.



Soft tissues:

Persistent thickened stump soft tissues.

Surgical clips in the proximal calf soft tissues and distal thigh soft tissues,

where there is also a vascular stent.



IMPRESSION: 



No acute fracture.

Persistent stump soft tissue swelling. Findings of the distal fibula could be

due to osteomyelitis.



Signed by: Olayinka Patiño DO on 10/26/2019 3:19 AM

## 2019-11-06 ENCOUNTER — HOSPITAL ENCOUNTER (EMERGENCY)
Dept: HOSPITAL 88 - ER | Age: 44
Discharge: HOME | End: 2019-11-06
Payer: MEDICARE

## 2019-11-06 VITALS — DIASTOLIC BLOOD PRESSURE: 78 MMHG | SYSTOLIC BLOOD PRESSURE: 126 MMHG

## 2019-11-06 VITALS — HEIGHT: 77 IN | BODY MASS INDEX: 23.73 KG/M2 | WEIGHT: 201 LBS

## 2019-11-06 DIAGNOSIS — Z86.718: ICD-10-CM
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
## 2019-11-06 DIAGNOSIS — I10: ICD-10-CM

## 2019-11-06 DIAGNOSIS — T87.89: Primary | ICD-10-CM

## 2019-11-06 DIAGNOSIS — Z86.711: ICD-10-CM

## 2019-11-06 DIAGNOSIS — Z79.01: ICD-10-CM

## 2019-11-06 DIAGNOSIS — G40.909: ICD-10-CM

## 2019-11-06 PROCEDURE — 99284 EMERGENCY DEPT VISIT MOD MDM: CPT

## 2020-07-21 NOTE — NUR
Problem: Non-Pressure Injury Wound  Goal: # No deterioration in wound  Outcome: Outcome Not Met, Plan Adjusted  consult       Received pt from PACU at this time. Pt is aox4 and able to verbalize needs. Clean dry 
dressing to left BKA and is elevated on pillow. Breaths are even and unlabored. Pt states 
pain is manageable at 5/10 at this time. Family is in the room.

## 2023-04-20 ENCOUNTER — HOSPITAL ENCOUNTER (OUTPATIENT)
Dept: HOSPITAL 88 - CT | Age: 48
End: 2023-04-20
Attending: INTERNAL MEDICINE
Payer: MEDICARE

## 2023-04-20 DIAGNOSIS — Z72.0: Primary | ICD-10-CM

## 2023-04-20 PROCEDURE — 71250 CT THORAX DX C-: CPT
